# Patient Record
Sex: FEMALE | Race: WHITE | NOT HISPANIC OR LATINO | Employment: OTHER | URBAN - METROPOLITAN AREA
[De-identification: names, ages, dates, MRNs, and addresses within clinical notes are randomized per-mention and may not be internally consistent; named-entity substitution may affect disease eponyms.]

---

## 2017-08-30 ENCOUNTER — TRANSCRIBE ORDERS (OUTPATIENT)
Dept: MAMMOGRAPHY | Facility: HOSPITAL | Age: 77
End: 2017-08-30

## 2017-08-30 DIAGNOSIS — Z12.31 VISIT FOR SCREENING MAMMOGRAM: Primary | ICD-10-CM

## 2017-09-13 ENCOUNTER — HOSPITAL ENCOUNTER (OUTPATIENT)
Dept: MAMMOGRAPHY | Facility: HOSPITAL | Age: 77
Discharge: HOME | End: 2017-09-13
Admitting: INTERNAL MEDICINE

## 2017-09-13 DIAGNOSIS — Z12.31 VISIT FOR SCREENING MAMMOGRAM: ICD-10-CM

## 2017-09-13 PROCEDURE — G0202 SCR MAMMO BI INCL CAD: HCPCS

## 2017-09-13 PROCEDURE — 77063 BREAST TOMOSYNTHESIS BI: CPT | Performed by: RADIOLOGY

## 2017-09-13 PROCEDURE — 77063 BREAST TOMOSYNTHESIS BI: CPT

## 2017-09-13 PROCEDURE — G0202 SCR MAMMO BI INCL CAD: HCPCS | Performed by: RADIOLOGY

## 2018-10-17 ENCOUNTER — TRANSCRIBE ORDERS (OUTPATIENT)
Dept: ADMINISTRATIVE | Facility: HOSPITAL | Age: 78
End: 2018-10-17

## 2018-10-17 DIAGNOSIS — Z12.31 VISIT FOR SCREENING MAMMOGRAM: Primary | ICD-10-CM

## 2018-10-25 ENCOUNTER — HOSPITAL ENCOUNTER (OUTPATIENT)
Dept: MAMMOGRAPHY | Facility: HOSPITAL | Age: 78
Discharge: HOME | End: 2018-10-25
Admitting: INTERNAL MEDICINE

## 2018-10-25 DIAGNOSIS — Z12.31 VISIT FOR SCREENING MAMMOGRAM: ICD-10-CM

## 2018-10-25 PROCEDURE — 77067 SCR MAMMO BI INCL CAD: CPT

## 2018-10-25 PROCEDURE — 77067 SCR MAMMO BI INCL CAD: CPT | Performed by: RADIOLOGY

## 2018-10-25 PROCEDURE — 77063 BREAST TOMOSYNTHESIS BI: CPT | Performed by: RADIOLOGY

## 2018-10-25 PROCEDURE — 77063 BREAST TOMOSYNTHESIS BI: CPT

## 2019-02-27 ENCOUNTER — OFFICE VISIT (OUTPATIENT)
Dept: ORTHOPEDIC SURGERY | Facility: CLINIC | Age: 79
End: 2019-02-27

## 2019-02-27 VITALS — BODY MASS INDEX: 29.38 KG/M2 | HEIGHT: 65 IN | WEIGHT: 176.37 LBS

## 2019-02-27 DIAGNOSIS — M16.11 PRIMARY OSTEOARTHRITIS OF RIGHT HIP: Primary | ICD-10-CM

## 2019-02-27 PROCEDURE — 99203 OFFICE O/P NEW LOW 30 MIN: CPT | Performed by: ORTHOPAEDIC SURGERY

## 2019-02-27 RX ORDER — GABAPENTIN 300 MG/1
CAPSULE ORAL
Refills: 3 | COMMUNITY
Start: 2019-02-16 | End: 2020-09-08

## 2019-02-27 RX ORDER — FERROUS SULFATE 325(65) MG
TABLET ORAL
Refills: 2 | COMMUNITY
Start: 2019-01-23 | End: 2020-09-08

## 2019-02-27 RX ORDER — RIVAROXABAN 20 MG/1
20 TABLET, FILM COATED ORAL
Refills: 5 | COMMUNITY
Start: 2018-12-20

## 2019-02-27 RX ORDER — DOCUSATE SODIUM 100 MG/1
100 CAPSULE, LIQUID FILLED ORAL 2 TIMES DAILY PRN
Refills: 1 | COMMUNITY
Start: 2018-11-24

## 2019-02-27 RX ORDER — LISINOPRIL AND HYDROCHLOROTHIAZIDE 12.5; 1 MG/1; MG/1
1 TABLET ORAL DAILY
COMMUNITY
End: 2020-09-08

## 2019-02-27 RX ORDER — ATORVASTATIN CALCIUM 10 MG/1
10 TABLET, FILM COATED ORAL DAILY
Refills: 2 | COMMUNITY
Start: 2019-01-23 | End: 2020-09-08

## 2019-02-27 RX ORDER — OMEPRAZOLE 20 MG/1
CAPSULE, DELAYED RELEASE ORAL
COMMUNITY
Start: 2019-02-25 | End: 2020-09-08

## 2019-02-27 RX ORDER — BUSPIRONE HYDROCHLORIDE 10 MG/1
10 TABLET ORAL 2 TIMES DAILY
Refills: 2 | COMMUNITY
Start: 2019-02-11 | End: 2020-09-08

## 2019-02-27 RX ORDER — TRAZODONE HYDROCHLORIDE 100 MG/1
TABLET ORAL
Refills: 3 | COMMUNITY
Start: 2019-02-15 | End: 2020-09-08

## 2019-02-27 RX ORDER — DIGOXIN 125 MCG
125 TABLET ORAL DAILY
Refills: 2 | COMMUNITY
Start: 2019-01-06 | End: 2020-09-08

## 2019-02-27 RX ORDER — DICYCLOMINE HYDROCHLORIDE 10 MG/1
10 CAPSULE ORAL
COMMUNITY
End: 2020-09-08

## 2019-02-27 RX ORDER — CYANOCOBALAMIN 1000 UG/ML
INJECTION, SOLUTION INTRAMUSCULAR; SUBCUTANEOUS
Refills: 5 | COMMUNITY
Start: 2019-01-19 | End: 2020-09-08

## 2019-02-27 RX ORDER — PRAMIPEXOLE DIHYDROCHLORIDE 0.5 MG/1
1 TABLET ORAL 2 TIMES DAILY
Refills: 2 | COMMUNITY
Start: 2018-11-27 | End: 2020-09-15 | Stop reason: HOSPADM

## 2019-02-27 NOTE — PROGRESS NOTES
[facility] [place] [facility] Note    Subjective     Chief Complaint   Patient presents with   • Right Hip - Pain        HPI    Zhanna Mccabe is a 79 y.o. female.  She presents today for evaluation of right hip pain.  The pain is intermittent, 4 out of 10, occurring with walking, standing, sitting and climbing stairs.  Occasional pain at night.  The pain is associated with stiffness and giving way.  The pain is aching quality, and she uses a cane or walker for ambulation.  She had an x-ray obtained which showed degeneration of the hip.      There is no problem list on file for this patient.    Past Medical History:   Diagnosis Date   • A-fib (CMS/HCC)    • Breast cancer (CMS/HCC) 1989    left breast   • Depression    • High cholesterol    • Hypertension    • IBS (irritable bowel syndrome)    • Restless leg       Past Surgical History:   Procedure Laterality Date   • ARM SKIN LESION BIOPSY / EXCISION      melanoma   • BLADDER REPAIR      prolapse   • BREAST BIOPSY     • CHOLECYSTECTOMY     • HAND SURGERY Right     4th & 5th fingers   • HYSTERECTOMY  1983   • HYSTERECTOMY     • KNEE SURGERY Left     ORIF   • MASTECTOMY Left 1989   • MASTECTOMY        Family History   Problem Relation Age of Onset   • Breast cancer Maternal Aunt 68   • Breast cancer Maternal Aunt 68   • Cancer Mother    • Hypertension Mother    • Cancer Father    • BRCA 1/2 Neg Hx      Social History     Socioeconomic History   • Marital status:      Spouse name: Not on file   • Number of children: Not on file   • Years of education: Not on file   • Highest education level: Not on file   Social Needs   • Financial resource strain: Not on file   • Food insecurity - worry: Not on file   • Food insecurity - inability: Not on file   • Transportation needs - medical: Not on file   • Transportation needs - non-medical: Not on file   Occupational History   • Not on file   Tobacco Use   • Smoking status: Never Smoker   • Smokeless tobacco: Never  Used   Substance and Sexual Activity   • Alcohol use: No     Frequency: Never   • Drug use: No   • Sexual activity: Defer   Other Topics Concern   • Not on file   Social History Narrative   • Not on file      Current Outpatient Medications on File Prior to Visit   Medication Sig Dispense Refill   • atorvastatin (LIPITOR) 10 MG tablet Take 10 mg by mouth Daily.  2   • busPIRone (BUSPAR) 10 MG tablet Take 10 mg by mouth 2 (Two) Times a Day.  2   • cyanocobalamin 1000 MCG/ML injection INJECT 1 ML IN THE MUSCLE WEEKLY FOR 4 WEEKS, THEN ONCE MONTHLY THEREAFTER  5   • dicyclomine (BENTYL) 10 MG capsule Take 10 mg by mouth 4 (Four) Times a Day Before Meals & at Bedtime.     • digoxin (LANOXIN) 125 MCG tablet Take 125 mcg by mouth Daily.  2   • docusate sodium (COLACE) 100 MG capsule Take 100 mg by mouth 2 (Two) Times a Day.  1   • ferrous sulfate 325 (65 FE) MG tablet TAKE 1 TABLET BY MOUTH EVERY DAY WITH OTC VIT C 500  2   • gabapentin (NEURONTIN) 300 MG capsule TAKE 1 CAPSULE BY MOUTH 2-3 TIMES PER DAY  3   • lisinopril-hydrochlorothiazide (PRINZIDE,ZESTORETIC) 10-12.5 MG per tablet Take 1 tablet by mouth Daily.     • metoprolol tartrate (LOPRESSOR) 25 MG tablet Take 25 mg by mouth 2 (Two) Times a Day.  6   • omeprazole (priLOSEC) 20 MG capsule      • pramipexole (MIRAPEX) 0.5 MG tablet Take 0.5 mg by mouth Every Evening.  2   • traZODone (DESYREL) 100 MG tablet TAKE 1 TABLET BY MOUTH EVERYDAY AT BEDTIME  3   • XARELTO 20 MG tablet TABLETS BY MOUTH 1 TAB DAILY  5     No current facility-administered medications on file prior to visit.       Allergies   Allergen Reactions   • Abilify [Aripiprazole] Swelling     Of mouth and lips   • Ambien [Zolpidem] Other (See Comments)     Sleep walks   • Lyrica [Pregabalin] Swelling     Of mouth and lips        Review of Systems   Constitutional: Positive for activity change and fatigue. Negative for appetite change, chills, diaphoresis, fever and unexpected weight change.   HENT:  "Positive for hearing loss. Negative for congestion, dental problem, drooling, ear discharge, ear pain, facial swelling, mouth sores, nosebleeds, postnasal drip, rhinorrhea, sinus pressure, sneezing, sore throat, tinnitus, trouble swallowing and voice change.    Eyes: Negative for photophobia, pain, discharge, redness, itching and visual disturbance.   Respiratory: Positive for apnea and shortness of breath. Negative for cough, choking, chest tightness, wheezing and stridor.    Cardiovascular: Negative for chest pain, palpitations and leg swelling.   Gastrointestinal: Negative for abdominal distention, abdominal pain, anal bleeding, blood in stool, constipation, diarrhea, nausea, rectal pain and vomiting.   Endocrine: Negative for cold intolerance, heat intolerance, polydipsia, polyphagia and polyuria.   Genitourinary: Negative for decreased urine volume, difficulty urinating, dysuria, enuresis, flank pain, frequency, genital sores, hematuria and urgency.   Musculoskeletal: Positive for gait problem. Negative for arthralgias, back pain, joint swelling, myalgias, neck pain and neck stiffness.   Skin: Negative for color change, pallor, rash and wound.   Allergic/Immunologic: Negative for environmental allergies, food allergies and immunocompromised state.   Neurological: Positive for weakness. Negative for dizziness, tremors, seizures, syncope, facial asymmetry, speech difficulty, light-headedness, numbness and headaches.   Hematological: Negative for adenopathy. Does not bruise/bleed easily.   Psychiatric/Behavioral: Positive for sleep disturbance. Negative for agitation, behavioral problems, confusion, decreased concentration, dysphoric mood, hallucinations, self-injury and suicidal ideas. The patient is nervous/anxious. The patient is not hyperactive.         Objective      Physical Exam  Ht 166 cm (65.35\")   Wt 80 kg (176 lb 5.9 oz)   BMI 29.03 kg/m²     Body mass index is 29.03 kg/m².    General:   Mental " Status:  Alert   Appearance: Cooperative, in no acute distress   Build and Nutrition: Well-nourished and well developed female   Orientation: Alert and oriented to person, place and time   Posture: Normal   Gait: Slow and cautious, but not particularly antalgic today    Integument:   Right hip: No skin lesions, no rash, no ecchymosis    Neurologic:   Sensation:    Right foot: Intact to light touch on the dorsal and plantar aspect   Motor:  Right lower extremity: 5/5 quadriceps, hamstrings, ankle dorsiflexors, and ankle plantar flexors    Vascular:   Right lower extremity: 2+ dorsalis pedis pulse, prompt capillary refill    Lower Extremities:   Right Hip:    Tenderness:  None    Swelling: None    Crepitus:  None    Atrophy:  None    Range of motion:  External Rotation: 30°       Internal Rotation: 30°       Flexion:  90°       Extension:  0°   Instability:  None  Deformities:  None  Functional testing: Positive Stinchfield    No leg length discrepancy        Imaging/Studies    Outside radiographs from [facility] from 6/22/2018 were reviewed, which showed moderate degenerative changes with no acute bony process.  Joint space narrowing was appreciated.    Assessment and Plan     Zhanna was seen today for pain.    Diagnoses and all orders for this visit:    Primary osteoarthritis of right hip  -     External Facility Surgical/Procedural Request; Future        I reviewed my findings with the patient today.  She does have right hip arthritis.  She preferred a more conservative approach, given her intermittent symptoms.  For both diagnostic and therapeutic purposes, we discussed an intra-articular hip injection, she would like to proceed.  She would like to avoid surgery if possible.  I will see her back after the hip injection, but sooner for any problems.    I would like to obtain new x-rays on her next visit.    Return in about 4 weeks (around 3/27/2019) for After Hip Injection, Recheck with  X-Rays.      Medical Decision Making  Management Options : prescription/IM medicine  Data/Risk: independent visualization of imaging, lab tests, or EMG/NCV      Allan Quintana MD  02/27/19  5:38 PM

## 2019-03-08 ENCOUNTER — OUTSIDE FACILITY SERVICE (OUTPATIENT)
Dept: ORTHOPEDIC SURGERY | Facility: CLINIC | Age: 79
End: 2019-03-08

## 2019-03-08 PROCEDURE — 77002 NEEDLE LOCALIZATION BY XRAY: CPT | Performed by: ORTHOPAEDIC SURGERY

## 2019-03-08 PROCEDURE — 20610 DRAIN/INJ JOINT/BURSA W/O US: CPT | Performed by: ORTHOPAEDIC SURGERY

## 2019-04-17 ENCOUNTER — OFFICE VISIT (OUTPATIENT)
Dept: ORTHOPEDIC SURGERY | Facility: CLINIC | Age: 79
End: 2019-04-17

## 2019-04-17 VITALS — WEIGHT: 176.37 LBS | HEIGHT: 65 IN | BODY MASS INDEX: 29.38 KG/M2 | OXYGEN SATURATION: 94 % | HEART RATE: 76 BPM

## 2019-04-17 DIAGNOSIS — M16.11 PRIMARY OSTEOARTHRITIS OF RIGHT HIP: Primary | ICD-10-CM

## 2019-04-17 PROCEDURE — 99213 OFFICE O/P EST LOW 20 MIN: CPT | Performed by: ORTHOPAEDIC SURGERY

## 2019-04-17 RX ORDER — MAGNESIUM OXIDE 400 MG/1
TABLET ORAL
Refills: 1 | COMMUNITY
Start: 2019-02-25 | End: 2020-09-08

## 2019-04-17 RX ORDER — RANITIDINE 150 MG/1
TABLET ORAL
COMMUNITY
Start: 2019-04-11 | End: 2020-09-08

## 2019-04-17 NOTE — PROGRESS NOTES
[facility] [place] [facility] Note    Subjective     Chief Complaint   Patient presents with   • Follow-up     1 month follow up after right hip injection @ [facility] 03/08/19        HPI    Zhanna Mccabe is a 79 y.o. female.  She follows up today for her right hip.  She continues to have pain in the hip, with improvement in her pain for a couple of weeks following the injection, but the pain has returned.  Pain is 9 out of 10, aching in quality, worse with walking, standing and climbing stairs.  It is associated with stiffness.  Overall she has multiple aches and pains, shoulders, and both legs.  The hip pain is overall improved following the injection, but is starting to return to its preinjection level.      There is no problem list on file for this patient.    Past Medical History:   Diagnosis Date   • A-fib (CMS/HCC)    • Breast cancer (CMS/HCC) 1989    left breast   • Depression    • High cholesterol    • Hypertension    • IBS (irritable bowel syndrome)    • Restless leg       Past Surgical History:   Procedure Laterality Date   • ARM SKIN LESION BIOPSY / EXCISION      melanoma   • BLADDER REPAIR      prolapse   • BREAST BIOPSY     • CHOLECYSTECTOMY     • HAND SURGERY Right     4th & 5th fingers   • HYSTERECTOMY  1983   • HYSTERECTOMY     • KNEE SURGERY Left     ORIF   • MASTECTOMY Left 1989   • MASTECTOMY        Family History   Problem Relation Age of Onset   • Breast cancer Maternal Aunt 68   • Breast cancer Maternal Aunt 68   • Cancer Mother    • Hypertension Mother    • Cancer Father    • BRCA 1/2 Neg Hx      Social History     Socioeconomic History   • Marital status:      Spouse name: Not on file   • Number of children: Not on file   • Years of education: Not on file   • Highest education level: Not on file   Tobacco Use   • Smoking status: Never Smoker   • Smokeless tobacco: Never Used   Substance and Sexual Activity   • Alcohol use: No     Frequency: Never   • Drug use: No   • Sexual activity:  Defer      Current Outpatient Medications on File Prior to Visit   Medication Sig Dispense Refill   • atorvastatin (LIPITOR) 10 MG tablet Take 10 mg by mouth Daily.  2   • busPIRone (BUSPAR) 10 MG tablet Take 10 mg by mouth 2 (Two) Times a Day.  2   • cyanocobalamin 1000 MCG/ML injection INJECT 1 ML IN THE MUSCLE WEEKLY FOR 4 WEEKS, THEN ONCE MONTHLY THEREAFTER  5   • dicyclomine (BENTYL) 10 MG capsule Take 10 mg by mouth 4 (Four) Times a Day Before Meals & at Bedtime.     • digoxin (LANOXIN) 125 MCG tablet Take 125 mcg by mouth Daily.  2   • docusate sodium (COLACE) 100 MG capsule Take 100 mg by mouth 2 (Two) Times a Day.  1   • ferrous sulfate 325 (65 FE) MG tablet TAKE 1 TABLET BY MOUTH EVERY DAY WITH OTC VIT C 500  2   • gabapentin (NEURONTIN) 300 MG capsule TAKE 1 CAPSULE BY MOUTH 2-3 TIMES PER DAY  3   • lisinopril-hydrochlorothiazide (PRINZIDE,ZESTORETIC) 10-12.5 MG per tablet Take 1 tablet by mouth Daily.     • magnesium oxide (MAG-OX) 400 MG tablet TAKE 1 TABLET BY MOUTH DAILY FOR LEG CRAMPS  1   • metoprolol tartrate (LOPRESSOR) 25 MG tablet Take 25 mg by mouth 2 (Two) Times a Day.  6   • omeprazole (priLOSEC) 20 MG capsule      • pramipexole (MIRAPEX) 0.5 MG tablet Take 0.5 mg by mouth Every Evening.  2   • raNITIdine (ZANTAC) 150 MG tablet      • traZODone (DESYREL) 100 MG tablet TAKE 1 TABLET BY MOUTH EVERYDAY AT BEDTIME  3   • XARELTO 20 MG tablet TABLETS BY MOUTH 1 TAB DAILY  5     No current facility-administered medications on file prior to visit.       Allergies   Allergen Reactions   • Abilify [Aripiprazole] Swelling     Of mouth and lips   • Ambien [Zolpidem] Other (See Comments)     Sleep walks   • Lyrica [Pregabalin] Swelling     Of mouth and lips        Review of Systems   Constitutional: Negative for activity change, appetite change, chills, diaphoresis, fatigue, fever and unexpected weight change.   HENT: Negative for congestion, dental problem, drooling, ear discharge, ear pain, facial  "swelling, hearing loss, mouth sores, nosebleeds, postnasal drip, rhinorrhea, sinus pressure, sneezing, sore throat, tinnitus, trouble swallowing and voice change.    Eyes: Negative for photophobia, pain, discharge, redness, itching and visual disturbance.   Respiratory: Negative for apnea, cough, choking, chest tightness, shortness of breath, wheezing and stridor.    Cardiovascular: Negative for chest pain, palpitations and leg swelling.   Gastrointestinal: Negative for abdominal distention, abdominal pain, anal bleeding, blood in stool, constipation, diarrhea, nausea, rectal pain and vomiting.   Endocrine: Negative for cold intolerance, heat intolerance, polydipsia, polyphagia and polyuria.   Genitourinary: Negative for decreased urine volume, difficulty urinating, dysuria, enuresis, flank pain, frequency, genital sores, hematuria and urgency.   Musculoskeletal: Positive for joint swelling. Negative for arthralgias, back pain, gait problem, myalgias, neck pain and neck stiffness.   Skin: Negative for color change, pallor, rash and wound.   Allergic/Immunologic: Negative for environmental allergies, food allergies and immunocompromised state.   Neurological: Negative for dizziness, tremors, seizures, syncope, facial asymmetry, speech difficulty, weakness, light-headedness, numbness and headaches.   Hematological: Negative for adenopathy. Does not bruise/bleed easily.   Psychiatric/Behavioral: Negative for agitation, behavioral problems, confusion, decreased concentration, dysphoric mood, hallucinations, self-injury, sleep disturbance and suicidal ideas. The patient is not nervous/anxious and is not hyperactive.         Objective      Physical Exam  Pulse 76   Ht 166 cm (65.35\")   Wt 80 kg (176 lb 5.9 oz)   SpO2 94%   Breastfeeding? No   BMI 29.03 kg/m²     Body mass index is 29.03 kg/m².    General:   Mental Status:  Alert   Appearance: Cooperative, in no acute distress   Build and Nutrition: Well-nourished " well-developed female   Orientation: Alert and oriented to person, place and time   Posture: Normal   Gait: Slow and cautious with mild limp on the right    Integument:   Right hip: No skin lesions, no rash, no ecchymosis    Lower Extremity:   Right Hip:    Tenderness:  None    Swelling:  None    Crepitus:  None    Range of motion:  External Rotation: 30°       Internal Rotation: 30°       Flexion:  100°       Extension:  0°    Deformities:  None  Functional testing: Positive Stinchfield    No leg length discrepancy      Imaging/Studies  Imaging Results (last 24 hours)     Procedure Component Value Units Date/Time    XR Hip With or Without Pelvis 1 View Right [[id number]] Resulted:  04/17/19 0853     Updated:  04/17/19 0854    Narrative:       Right Hip Radiographs  Indication: right hip pain  Views: low AP pelvis and lateral of the right hip    Comparison: Outside films from 6/22/2018    Findings:   Stable findings compared to the previous films, with bone-on-bone contact,   medial wear in particular, with lateral calcification in the labral   region, with no acute bony abnormalities, and no unusual bony features.    Impression: Advanced right hip arthritis.            Assessment and Plan     Zhanna was seen today for follow-up.    Diagnoses and all orders for this visit:    Primary osteoarthritis of right hip  -     XR Hip With or Without Pelvis 1 View Right        1. Primary osteoarthritis of right hip        I reviewed my findings with the patient today.  She does have right hip arthritis, and may benefit from a hip replacement.  She does have multiple other joint pains, to include her shoulders and both lower extremities.  I think she would benefit from seeing a rheumatologist, and she is going to coordinate this through her primary care physician, Dr. Jose Waller.  I will see her back in 6 months, but sooner for any problems.    Return in about 6 months (around 10/17/2019).      Medical Decision  Making  Data/Risk: radiology tests and independent visualization of imaging, lab tests, or EMG/NCV      Allan Quintana MD  04/17/19  9:02 AM

## 2019-10-01 ENCOUNTER — TRANSCRIBE ORDERS (OUTPATIENT)
Dept: ADMINISTRATIVE | Facility: HOSPITAL | Age: 79
End: 2019-10-01

## 2019-10-01 DIAGNOSIS — Z92.89 HISTORY OF SCREENING MAMMOGRAPHY: Primary | ICD-10-CM

## 2019-10-01 DIAGNOSIS — Z12.31 VISIT FOR SCREENING MAMMOGRAM: ICD-10-CM

## 2019-10-23 ENCOUNTER — OFFICE VISIT (OUTPATIENT)
Dept: ORTHOPEDIC SURGERY | Facility: CLINIC | Age: 79
End: 2019-10-23

## 2019-10-23 VITALS — HEART RATE: 50 BPM | BODY MASS INDEX: 31.07 KG/M2 | OXYGEN SATURATION: 94 % | HEIGHT: 65 IN | WEIGHT: 186.51 LBS

## 2019-10-23 DIAGNOSIS — M16.11 PRIMARY OSTEOARTHRITIS OF RIGHT HIP: Primary | ICD-10-CM

## 2019-10-23 PROCEDURE — 99212 OFFICE O/P EST SF 10 MIN: CPT | Performed by: ORTHOPAEDIC SURGERY

## 2019-10-23 NOTE — PROGRESS NOTES
[facility] Note    Subjective     Chief Complaint   Patient presents with   • Follow-up     6 month follow up; Primary osteoarthritis of right hip         HPI    Zhanna Mccabe is a 79 y.o. female.  She follows up today for her right hip.  She continues to have pain in the hip, but has multiple other issues going on, most involving her abdomen.  Hip pain is primarily posteriorly.  She walks with a cane.      There is no problem list on file for this patient.    Past Medical History:   Diagnosis Date   • A-fib (CMS/HCC)    • Breast cancer (CMS/HCC) 1989    left breast   • Depression    • High cholesterol    • Hypertension    • IBS (irritable bowel syndrome)    • Restless leg       Past Surgical History:   Procedure Laterality Date   • ARM SKIN LESION BIOPSY / EXCISION      melanoma   • BLADDER REPAIR      prolapse   • BREAST BIOPSY     • CHOLECYSTECTOMY     • HAND SURGERY Right     4th & 5th fingers   • HYSTERECTOMY  1983   • HYSTERECTOMY     • KNEE SURGERY Left     ORIF   • MASTECTOMY Left 1989   • MASTECTOMY        Family History   Problem Relation Age of Onset   • Breast cancer Maternal Aunt 68   • Breast cancer Maternal Aunt 68   • Cancer Mother    • Hypertension Mother    • Cancer Father    • BRCA 1/2 Neg Hx      Social History     Socioeconomic History   • Marital status:      Spouse name: Not on file   • Number of children: Not on file   • Years of education: Not on file   • Highest education level: Not on file   Tobacco Use   • Smoking status: Never Smoker   • Smokeless tobacco: Never Used   Substance and Sexual Activity   • Alcohol use: No     Frequency: Never   • Drug use: No   • Sexual activity: Defer      Current Outpatient Medications on File Prior to Visit   Medication Sig Dispense Refill   • atorvastatin (LIPITOR) 10 MG tablet Take 10 mg by mouth Daily.  2   • busPIRone (BUSPAR) 10 MG tablet Take 10 mg by mouth 2 (Two) Times a Day.  2   • cyanocobalamin 1000 MCG/ML  injection INJECT 1 ML IN THE MUSCLE WEEKLY FOR 4 WEEKS, THEN ONCE MONTHLY THEREAFTER  5   • dicyclomine (BENTYL) 10 MG capsule Take 10 mg by mouth 4 (Four) Times a Day Before Meals & at Bedtime.     • digoxin (LANOXIN) 125 MCG tablet Take 125 mcg by mouth Daily.  2   • docusate sodium (COLACE) 100 MG capsule Take 100 mg by mouth 2 (Two) Times a Day.  1   • ferrous sulfate 325 (65 FE) MG tablet TAKE 1 TABLET BY MOUTH EVERY DAY WITH OTC VIT C 500  2   • gabapentin (NEURONTIN) 300 MG capsule TAKE 1 CAPSULE BY MOUTH 2-3 TIMES PER DAY  3   • lisinopril-hydrochlorothiazide (PRINZIDE,ZESTORETIC) 10-12.5 MG per tablet Take 1 tablet by mouth Daily.     • magnesium oxide (MAG-OX) 400 MG tablet TAKE 1 TABLET BY MOUTH DAILY FOR LEG CRAMPS  1   • metoprolol tartrate (LOPRESSOR) 25 MG tablet Take 25 mg by mouth 2 (Two) Times a Day.  6   • omeprazole (priLOSEC) 20 MG capsule      • pramipexole (MIRAPEX) 0.5 MG tablet Take 0.5 mg by mouth Every Evening.  2   • raNITIdine (ZANTAC) 150 MG tablet      • traZODone (DESYREL) 100 MG tablet TAKE 1 TABLET BY MOUTH EVERYDAY AT BEDTIME  3   • XARELTO 20 MG tablet TABLETS BY MOUTH 1 TAB DAILY  5     No current facility-administered medications on file prior to visit.       Allergies   Allergen Reactions   • Abilify [Aripiprazole] Swelling     Of mouth and lips   • Ambien [Zolpidem] Other (See Comments)     Sleep walks   • Lyrica [Pregabalin] Swelling     Of mouth and lips        Review of Systems   Constitutional: Negative.    HENT: Negative.    Eyes: Negative.    Respiratory: Negative.    Cardiovascular: Positive for leg swelling.   Gastrointestinal: Positive for abdominal pain and diarrhea.   Endocrine: Negative.    Genitourinary: Negative.    Musculoskeletal: Positive for arthralgias.   Skin: Negative.    Allergic/Immunologic: Negative.    Neurological: Negative.    Hematological: Negative.    Psychiatric/Behavioral: Negative.         Objective      Physical Exam  Pulse 50   Ht 166 cm  "(65.35\")   Wt 84.6 kg (186 lb 8.2 oz)   SpO2 94%   BMI 30.70 kg/m²     Body mass index is 30.7 kg/m².    General:   Mental Status:  Alert   Appearance: Cooperative, in no acute distress   Build and Nutrition: Well-nourished well-developed female   Orientation: Alert and oriented to person, place and time   Posture: Normal   Gait: With a cane, and a slight limp on the right    Lower Extremity:              Right Hip:                          Tenderness:    None                          Swelling:          None                          Crepitus:          None                          Range of motion:        External Rotation:       30°                                                              Internal Rotation:        30°                                                              Flexion:                       90°                                                              Extension:                   0°                       Deformities:     None  Functional testing:  [facility]                          No leg length discrepancy      Assessment and Plan     Zhanna was seen today for follow-up.    Diagnoses and all orders for this visit:    Primary osteoarthritis of right hip        1. Primary osteoarthritis of right hip        I reviewed my findings with the patient today.  She continues to have right hip pain, the pain is posterior, which may be coming from her back.  She has multiple other issues going on medically that take precedent, and is scheduled for colonoscopy and a urology procedure in the near future.  I will see her back in 6 months for checkup, but sooner for any problems.  X-rays of her hip on the next visit.    Return in about 6 months (around 4/23/2020).          Allan Quintana MD  10/23/19  9:42 AM            "

## 2019-11-26 ENCOUNTER — APPOINTMENT (OUTPATIENT)
Dept: MAMMOGRAPHY | Facility: HOSPITAL | Age: 79
End: 2019-11-26

## 2020-02-12 ENCOUNTER — TRANSCRIBE ORDERS (OUTPATIENT)
Dept: ADMINISTRATIVE | Facility: HOSPITAL | Age: 80
End: 2020-02-12

## 2020-02-12 DIAGNOSIS — Z12.31 VISIT FOR SCREENING MAMMOGRAM: Primary | ICD-10-CM

## 2020-04-09 ENCOUNTER — APPOINTMENT (OUTPATIENT)
Dept: MAMMOGRAPHY | Facility: HOSPITAL | Age: 80
End: 2020-04-09

## 2020-06-01 ENCOUNTER — APPOINTMENT (OUTPATIENT)
Dept: MAMMOGRAPHY | Facility: HOSPITAL | Age: 80
End: 2020-06-01

## 2020-07-18 ENCOUNTER — APPOINTMENT (OUTPATIENT)
Dept: CARDIOLOGY | Facility: HOSPITAL | Age: 80
End: 2020-07-18

## 2020-07-18 ENCOUNTER — HOSPITAL ENCOUNTER (EMERGENCY)
Facility: HOSPITAL | Age: 80
Discharge: HOME | End: 2020-07-18
Attending: EMERGENCY MEDICINE | Admitting: EMERGENCY MEDICINE

## 2020-07-18 VITALS
SYSTOLIC BLOOD PRESSURE: 186 MMHG | OXYGEN SATURATION: 95 % | TEMPERATURE: 97.9 F | BODY MASS INDEX: 28.32 KG/M2 | RESPIRATION RATE: 14 BRPM | HEART RATE: 60 BPM | DIASTOLIC BLOOD PRESSURE: 82 MMHG | HEIGHT: 65 IN | WEIGHT: 170 LBS

## 2020-07-18 DIAGNOSIS — L03.116 CELLULITIS OF LEFT LOWER LEG: Primary | ICD-10-CM

## 2020-07-18 LAB
BH CV ECHO MEAS - BSA(HAYCOCK): 1.9 M^2
BH CV ECHO MEAS - BSA: 1.8 M^2
BH CV ECHO MEAS - BZI_BMI: 28.3 KILOGRAMS/M^2
BH CV ECHO MEAS - BZI_METRIC_HEIGHT: 165.1 CM
BH CV ECHO MEAS - BZI_METRIC_WEIGHT: 77.1 KG
BH CV LOWER VASCULAR LEFT COMMON FEMORAL AUGMENT: NORMAL
BH CV LOWER VASCULAR LEFT COMMON FEMORAL COMPRESS: NORMAL
BH CV LOWER VASCULAR LEFT COMMON FEMORAL PHASIC: NORMAL
BH CV LOWER VASCULAR LEFT COMMON FEMORAL SPONT: NORMAL
BH CV LOWER VASCULAR LEFT DISTAL FEMORAL COMPRESS: NORMAL
BH CV LOWER VASCULAR LEFT GASTRONEMIUS COMPRESS: NORMAL
BH CV LOWER VASCULAR LEFT GREATER SAPH AK COMPRESS: NORMAL
BH CV LOWER VASCULAR LEFT GREATER SAPH BK COMPRESS: NORMAL
BH CV LOWER VASCULAR LEFT LESSER SAPH COMPRESS: NORMAL
BH CV LOWER VASCULAR LEFT MID FEMORAL AUGMENT: NORMAL
BH CV LOWER VASCULAR LEFT MID FEMORAL COMPRESS: NORMAL
BH CV LOWER VASCULAR LEFT MID FEMORAL PHASIC: NORMAL
BH CV LOWER VASCULAR LEFT MID FEMORAL SPONT: NORMAL
BH CV LOWER VASCULAR LEFT PERONEAL COMPRESS: NORMAL
BH CV LOWER VASCULAR LEFT POPLITEAL AUGMENT: NORMAL
BH CV LOWER VASCULAR LEFT POPLITEAL COMPRESS: NORMAL
BH CV LOWER VASCULAR LEFT POPLITEAL PHASIC: NORMAL
BH CV LOWER VASCULAR LEFT POPLITEAL SPONT: NORMAL
BH CV LOWER VASCULAR LEFT POSTERIOR TIBIAL COMPRESS: NORMAL
BH CV LOWER VASCULAR LEFT PROFUNDA FEMORAL COMPRESS: NORMAL
BH CV LOWER VASCULAR LEFT PROXIMAL FEMORAL COMPRESS: NORMAL
BH CV LOWER VASCULAR LEFT SAPHENOFEMORAL JUNCTION AUGMENT: NORMAL
BH CV LOWER VASCULAR LEFT SAPHENOFEMORAL JUNCTION COMPRESS: NORMAL
BH CV LOWER VASCULAR LEFT SAPHENOFEMORAL JUNCTION PHASIC: NORMAL
BH CV LOWER VASCULAR LEFT SAPHENOFEMORAL JUNCTION SPONT: NORMAL
BH CV LOWER VASCULAR RIGHT COMMON FEMORAL AUGMENT: NORMAL
BH CV LOWER VASCULAR RIGHT COMMON FEMORAL COMPRESS: NORMAL
BH CV LOWER VASCULAR RIGHT COMMON FEMORAL PHASIC: NORMAL
BH CV LOWER VASCULAR RIGHT COMMON FEMORAL SPONT: NORMAL

## 2020-07-18 PROCEDURE — 93971 EXTREMITY STUDY: CPT

## 2020-07-18 PROCEDURE — 99284 EMERGENCY DEPT VISIT MOD MDM: CPT

## 2020-07-18 PROCEDURE — 93971 EXTREMITY STUDY: CPT | Performed by: INTERNAL MEDICINE

## 2020-07-18 PROCEDURE — 63710000001 ONDANSETRON ODT 4 MG TABLET DISPERSIBLE: Performed by: EMERGENCY MEDICINE

## 2020-07-18 RX ORDER — ONDANSETRON 4 MG/1
4 TABLET, ORALLY DISINTEGRATING ORAL ONCE
Status: COMPLETED | OUTPATIENT
Start: 2020-07-18 | End: 2020-07-18

## 2020-07-18 RX ORDER — AMIODARONE HYDROCHLORIDE 200 MG/1
200 TABLET ORAL DAILY
COMMUNITY

## 2020-07-18 RX ORDER — DULOXETIN HYDROCHLORIDE 30 MG/1
30 CAPSULE, DELAYED RELEASE ORAL DAILY
COMMUNITY
End: 2020-09-15 | Stop reason: HOSPADM

## 2020-07-18 RX ORDER — HYDROCODONE BITARTRATE AND ACETAMINOPHEN 7.5; 325 MG/1; MG/1
1 TABLET ORAL EVERY 6 HOURS PRN
Status: ON HOLD | COMMUNITY
End: 2020-09-15 | Stop reason: SDUPTHER

## 2020-07-18 RX ORDER — CLINDAMYCIN HYDROCHLORIDE 300 MG/1
300 CAPSULE ORAL 3 TIMES DAILY
Qty: 30 CAPSULE | Refills: 0 | Status: SHIPPED | OUTPATIENT
Start: 2020-07-18 | End: 2020-09-08

## 2020-07-18 RX ORDER — HYDROCODONE BITARTRATE AND ACETAMINOPHEN 5; 325 MG/1; MG/1
1 TABLET ORAL ONCE
Status: COMPLETED | OUTPATIENT
Start: 2020-07-18 | End: 2020-07-18

## 2020-07-18 RX ADMIN — HYDROCODONE BITARTRATE AND ACETAMINOPHEN 1 TABLET: 5; 325 TABLET ORAL at 16:52

## 2020-07-18 RX ADMIN — ONDANSETRON 4 MG: 4 TABLET, ORALLY DISINTEGRATING ORAL at 16:44

## 2020-07-18 NOTE — ED PROVIDER NOTES
Subjective   80-year-old female presents for evaluation of left foot/lower leg pain/swelling.  Of note, the patient states that she underwent lumbar surgery 4 days ago following a fall at an outside facility.  The following day after surgery she began experiencing pain, swelling, and redness to her left foot/ankle/lower extremity that has persisted since that time.  Of note, the patient has a history of atrial fibrillation for which she is anticoagulated and endorses compliance with all of her medications.  She denies any fevers or systemic symptoms.  She denies any paresthesias.  She denies any injury or trauma to her left lower extremity that could have triggered her current symptoms.          Review of Systems   Skin:        Redness and soft tissue swelling to left foot/ankle/lower leg   All other systems reviewed and are negative.      Past Medical History:   Diagnosis Date   • A-fib (CMS/[facility])    • Breast cancer (CMS/[facility]) 1989    left breast   • Depression    • High cholesterol    • Hypertension    • IBS (irritable bowel syndrome)    • Restless leg        Allergies   Allergen Reactions   • Abilify [Aripiprazole] Swelling     Of mouth and lips   • Ambien [Zolpidem] Other (See Comments)     Sleep walks   • Lyrica [Pregabalin] Swelling     Of mouth and lips       Past Surgical History:   Procedure Laterality Date   • ARM SKIN LESION BIOPSY / EXCISION      melanoma   • BLADDER REPAIR      prolapse   • BREAST BIOPSY     • CHOLECYSTECTOMY     • HAND SURGERY Right     4th & 5th fingers   • HYSTERECTOMY  1983   • HYSTERECTOMY     • KNEE SURGERY Left     ORIF   • MASTECTOMY Left 1989   • MASTECTOMY         Family History   Problem Relation Age of Onset   • Breast cancer Maternal Aunt 68   • Breast cancer Maternal Aunt 68   • Cancer Mother    • Hypertension Mother    • Cancer Father    • BRCA 1/2 Neg Hx        Social History     Socioeconomic History   • Marital status:      Spouse name: Not on file   • Number of  children: Not on file   • Years of education: Not on file   • Highest education level: Not on file   Tobacco Use   • Smoking status: Never Smoker   • Smokeless tobacco: Never Used   Substance and Sexual Activity   • Alcohol use: No     Frequency: Never   • Drug use: No   • Sexual activity: Defer           Objective   Physical Exam   Constitutional: She is oriented to person, place, and time. She appears well-developed and well-nourished. No distress.   Nontoxic-appearing female   HENT:   Head: Normocephalic and atraumatic.   Mouth/Throat: Oropharynx is clear and moist.   No mucous membrane lesions   Eyes: Pupils are equal, round, and reactive to light. EOM are normal.   Cardiovascular: Normal rate, regular rhythm, normal heart sounds and intact distal pulses. Exam reveals no gallop and no friction rub.   No murmur heard.  Pulmonary/Chest: Effort normal and breath sounds normal. No respiratory distress. She has no wheezes. She has no rales.   Abdominal: Soft. Bowel sounds are normal. She exhibits no distension and no mass. There is no tenderness. There is no rebound and no guarding.   Musculoskeletal: Normal range of motion.   Circumferential soft tissue swelling noted to left foot and ankle as well as distal left lower leg    Negative Homans sign, no palpable cords    No midline lumbar spine tenderness to palpation, no step-off or deformity noted   Neurological: She is alert and oriented to person, place, and time.   Left lower extremity is neurovascularly intact distally with bounding distal pulses and normal sensation   Skin: She is not diaphoretic. There is erythema.   Warm, tender, macular erythema noted to dorsal aspect of left foot, left ankle circumferentially, and distal aspect of left lower leg    No crepitus, no pain out of proportion to exam, no purulence, negative Nikolsky's sign   Psychiatric: She has a normal mood and affect. Judgment and thought content normal.   Nursing note and vitals  reviewed.      Procedures           ED Course  ED Course as of Jul 18 1844   Sat Jul 18, 2020   1530 80-year-old female presents for evaluation of left foot pain and swelling.  Of note, the patient had surgery on her lumbar spine 4 days ago.  She states that the following day she began experiencing pain and swelling in her left foot and ankle as well as her left lower leg that has persisted since that time.  She denies any injury or trauma to the leg.  Of note, the patient is anticoagulated for atrial fibrillation and endorses compliance with her medications.  No fevers or systemic symptoms.  On arrival, the patient is nontoxic-appearing.  Exam remarkable for circumferential swelling to left foot/ankle/distal lower leg with overlying macular erythema and warmth.  No pain out of proportion to exam.  She is neurovascularly intact distally with bounding distal pulses and normal sensation.  No fevers or systemic symptoms noted.  We will obtain a Doppler ultrasound to rule out DVT and will reassess following initial interventions.    [DD]   1626 Ultrasound is negative for DVT.  I feel that the patient's overall clinical presentation appears consistent with cellulitis.  Doubt septic joint at this time given her overall appearance.  Prescription for clindamycin.  She will follow-up with her primary care physician within the next week for recheck.  Agreeable with plan and given appropriate strict return precautions.    [DD]      ED Course User Index  [DD] Hamlet Montana MD                                 Recent Results (from the past 24 hour(s))   Duplex Venous Lower Extremity - Left    Collection Time: 07/18/20  3:53 PM   Result Value Ref Range    BSA 1.8 m^2     CV ECHO ALFRED - BZI_BMI 28.3 kilograms/m^2     CV ECHO ALFRED - BSA(HAYCOCK) 1.9 m^2     CV ECHO ALFRED - BZI_METRIC_WEIGHT 77.1 kg     CV ECHO ALFRED - BZI_METRIC_HEIGHT 165.1 cm    Right Common Femoral Spont Y     Right Common Femoral Phasic Y     Right  "Common Femoral Augment Y     Right Common Femoral Compress C     Left Common Femoral Spont Y     Left Common Femoral Phasic Y     Left Common Femoral Augment Y     Left Common Femoral Compress C     Left Saphenofemoral Junction Spont Y     Left Saphenofemoral Junction Phasic Y     Left Saphenofemoral Junction Augment Y     Left Saphenofemoral Junction Compress C     Left Profunda Femoral Compress C     Left Proximal Femoral Compress C     Left Mid Femoral Spont Y     Left Mid Femoral Phasic Y     Left Mid Femoral Augment Y     Left Mid Femoral Compress C     Left Distal Femoral Compress C     Left Popliteal Spont Y     Left Popliteal Phasic Y     Left Popliteal Augment Y     Left Popliteal Compress C     Left Posterior Tibial Compress C     Left Peroneal Compress C     Left GastronemiusSoleal Compress C     Left Greater Saph AK Compress C     Left Greater Saph BK Compress C     Left Lesser Saph Compress C      Note: In addition to lab results from this visit, the labs listed above may include labs taken at another facility or during a different encounter within the last 24 hours. Please correlate lab times with ED admission and discharge times for further clarification of the services performed during this visit.    No orders to display     Vitals:    07/18/20 1528 07/18/20 1553 07/18/20 1600 07/18/20 1654   BP:   164/77 (!) 186/82   Pulse:    60   Resp:       Temp:       TempSrc:       SpO2: 95%   95%   Weight:  77.1 kg (170 lb)     Height:  165.1 cm (65\")       Medications   HYDROcodone-acetaminophen (NORCO) 5-325 MG per tablet 1 tablet (1 tablet Oral Given 7/18/20 1652)   ondansetron ODT (ZOFRAN-ODT) disintegrating tablet 4 mg (4 mg Oral Given 7/18/20 1644)     ECG/EMG Results (last 24 hours)     ** No results found for the last 24 hours. **        No orders to display                 MDM    Final diagnoses:   Cellulitis of left lower leg            Hamlet Montana MD  07/18/20 1849    "

## 2020-07-28 ENCOUNTER — HOSPITAL ENCOUNTER (OUTPATIENT)
Dept: GENERAL RADIOLOGY | Facility: HOSPITAL | Age: 80
Discharge: HOME | End: 2020-07-28
Admitting: INTERNAL MEDICINE

## 2020-07-28 ENCOUNTER — TRANSCRIBE ORDERS (OUTPATIENT)
Dept: ADMINISTRATIVE | Facility: HOSPITAL | Age: 80
End: 2020-07-28

## 2020-07-28 DIAGNOSIS — R52 PAIN: Primary | ICD-10-CM

## 2020-07-28 PROCEDURE — 72110 X-RAY EXAM L-2 SPINE 4/>VWS: CPT

## 2020-09-08 ENCOUNTER — APPOINTMENT (OUTPATIENT)
Dept: GENERAL RADIOLOGY | Facility: HOSPITAL | Age: 80
End: 2020-09-08

## 2020-09-08 ENCOUNTER — APPOINTMENT (OUTPATIENT)
Dept: CT IMAGING | Facility: HOSPITAL | Age: 80
End: 2020-09-08

## 2020-09-08 ENCOUNTER — HOSPITAL ENCOUNTER (INPATIENT)
Facility: HOSPITAL | Age: 80
LOS: 7 days | Discharge: NURSING, REHAB OR LONG-TERM CARE | End: 2020-09-15
Attending: EMERGENCY MEDICINE | Admitting: FAMILY MEDICINE

## 2020-09-08 DIAGNOSIS — E87.1 ACUTE HYPONATREMIA: Primary | ICD-10-CM

## 2020-09-08 DIAGNOSIS — I10 ELEVATED BLOOD PRESSURE READING WITH DIAGNOSIS OF HYPERTENSION: ICD-10-CM

## 2020-09-08 DIAGNOSIS — G89.29 OTHER CHRONIC PAIN: ICD-10-CM

## 2020-09-08 DIAGNOSIS — R29.6 FREQUENT FALLS: ICD-10-CM

## 2020-09-08 DIAGNOSIS — Z74.09 IMPAIRED MOBILITY AND ADLS: ICD-10-CM

## 2020-09-08 DIAGNOSIS — R91.1 LUNG NODULE: ICD-10-CM

## 2020-09-08 DIAGNOSIS — R09.02 HYPOXEMIA: ICD-10-CM

## 2020-09-08 DIAGNOSIS — R41.82 ACUTE ON CHRONIC ALTERATION IN MENTAL STATUS: ICD-10-CM

## 2020-09-08 DIAGNOSIS — Z78.9 IMPAIRED MOBILITY AND ADLS: ICD-10-CM

## 2020-09-08 PROBLEM — R79.89 ELEVATED LFTS: Status: ACTIVE | Noted: 2020-09-08

## 2020-09-08 PROBLEM — N39.0 ACUTE UTI (URINARY TRACT INFECTION): Status: ACTIVE | Noted: 2020-09-08

## 2020-09-08 PROBLEM — N17.9 AKI (ACUTE KIDNEY INJURY) (HCC): Status: ACTIVE | Noted: 2020-09-08

## 2020-09-08 PROBLEM — E78.5 HLD (HYPERLIPIDEMIA): Status: ACTIVE | Noted: 2020-09-08
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
## 2020-09-08 PROBLEM — I48.91 A-FIB (HCC): Status: ACTIVE | Noted: 2020-09-08

## 2020-09-08 PROBLEM — R53.1 GENERALIZED WEAKNESS: Status: ACTIVE | Noted: 2020-09-08

## 2020-09-08 PROBLEM — K44.9 HIATAL HERNIA: Status: ACTIVE | Noted: 2020-09-08

## 2020-09-08 PROBLEM — F32.A DEPRESSION: Status: ACTIVE | Noted: 2020-09-08

## 2020-09-08 LAB
ABO GROUP BLD: NORMAL
ALBUMIN SERPL-MCNC: 4.4 G/DL (ref 3.5–5.2)
ALBUMIN/GLOB SERPL: 1.5 G/DL
ALP SERPL-CCNC: 206 U/L (ref 39–117)
ALT SERPL W P-5'-P-CCNC: 114 U/L (ref 1–33)
ANION GAP SERPL CALCULATED.3IONS-SCNC: 12 MMOL/L (ref 5–15)
ARTERIAL PATENCY WRIST A: ABNORMAL
AST SERPL-CCNC: 121 U/L (ref 1–32)
ATMOSPHERIC PRESS: ABNORMAL MM[HG]
BACTERIA UR QL AUTO: NORMAL /HPF
BASE EXCESS BLDA CALC-SCNC: -0.9 MMOL/L (ref 0–2)
BASOPHILS # BLD AUTO: 0.04 10*3/MM3 (ref 0–0.2)
BASOPHILS NFR BLD AUTO: 0.5 % (ref 0–1.5)
BDY SITE: ABNORMAL
BILIRUB SERPL-MCNC: 2.2 MG/DL (ref 0–1.2)
BILIRUB UR QL STRIP: NEGATIVE
BLD GP AB SCN SERPL QL: NEGATIVE
BODY TEMPERATURE: 37 C
BUN SERPL-MCNC: 28 MG/DL (ref 8–23)
BUN/CREAT SERPL: 25 (ref 7–25)
CALCIUM SPEC-SCNC: 9.8 MG/DL (ref 8.6–10.5)
CHLORIDE SERPL-SCNC: 81 MMOL/L (ref 98–107)
CLARITY UR: CLEAR
CO2 BLDA-SCNC: 23.6 MMOL/L (ref 22–33)
CO2 SERPL-SCNC: 23 MMOL/L (ref 22–29)
COHGB MFR BLD: -0.1 % (ref 0–2)
COLOR UR: YELLOW
CREAT SERPL-MCNC: 1.12 MG/DL (ref 0.57–1)
D-LACTATE SERPL-SCNC: 1.1 MMOL/L (ref 0.5–2)
DEPRECATED RDW RBC AUTO: 45.1 FL (ref 37–54)
DIGOXIN SERPL-MCNC: <0.3 NG/ML (ref 0.6–1.2)
EOSINOPHIL # BLD AUTO: 0.03 10*3/MM3 (ref 0–0.4)
EOSINOPHIL NFR BLD AUTO: 0.3 % (ref 0.3–6.2)
ERYTHROCYTE [DISTWIDTH] IN BLOOD BY AUTOMATED COUNT: 15.4 % (ref 12.3–15.4)
GFR SERPL CREATININE-BSD FRML MDRD: 47 ML/MIN/1.73
GLOBULIN UR ELPH-MCNC: 3 GM/DL
GLUCOSE SERPL-MCNC: 113 MG/DL (ref 65–99)
GLUCOSE UR STRIP-MCNC: NEGATIVE MG/DL
HAV IGM SERPL QL IA: NORMAL
HBV CORE IGM SERPL QL IA: NORMAL
HBV SURFACE AG SERPL QL IA: NORMAL
HCO3 BLDA-SCNC: 22.6 MMOL/L (ref 20–26)
HCT VFR BLD AUTO: 37.4 % (ref 34–46.6)
HCT VFR BLD CALC: 35.7 %
HCV AB SER DONR QL: NORMAL
HGB BLD-MCNC: 12 G/DL (ref 12–15.9)
HGB BLDA-MCNC: 11.7 G/DL (ref 14–18)
HGB UR QL STRIP.AUTO: NEGATIVE
HOLD SPECIMEN: NORMAL
HOLD SPECIMEN: NORMAL
HYALINE CASTS UR QL AUTO: NORMAL /LPF
IMM GRANULOCYTES # BLD AUTO: 0.16 10*3/MM3 (ref 0–0.05)
IMM GRANULOCYTES NFR BLD AUTO: 1.9 % (ref 0–0.5)
INHALED O2 CONCENTRATION: 28 %
KETONES UR QL STRIP: NEGATIVE
LEUKOCYTE ESTERASE UR QL STRIP.AUTO: NEGATIVE
LYMPHOCYTES # BLD AUTO: 0.8 10*3/MM3 (ref 0.7–3.1)
LYMPHOCYTES NFR BLD AUTO: 9.3 % (ref 19.6–45.3)
MCH RBC QN AUTO: 26 PG (ref 26.6–33)
MCHC RBC AUTO-ENTMCNC: 32.1 G/DL (ref 31.5–35.7)
MCV RBC AUTO: 81 FL (ref 79–97)
METHGB BLD QL: 0.4 % (ref 0–1.5)
MODALITY: ABNORMAL
MONOCYTES # BLD AUTO: 1.22 10*3/MM3 (ref 0.1–0.9)
MONOCYTES NFR BLD AUTO: 14.2 % (ref 5–12)
NEUTROPHILS NFR BLD AUTO: 6.34 10*3/MM3 (ref 1.7–7)
NEUTROPHILS NFR BLD AUTO: 73.8 % (ref 42.7–76)
NITRITE UR QL STRIP: POSITIVE
NOTE: ABNORMAL
NRBC BLD AUTO-RTO: 0 /100 WBC (ref 0–0.2)
NT-PROBNP SERPL-MCNC: 1159 PG/ML (ref 0–1800)
OSMOLALITY SERPL: 260 MOSM/KG (ref 275–295)
OSMOLALITY UR: 471 MOSM/KG (ref 300–1100)
OXYHGB MFR BLDV: 94.2 % (ref 94–99)
PCO2 BLDA: 32.8 MM HG (ref 35–45)
PCO2 TEMP ADJ BLD: 32.8 MM HG (ref 35–45)
PH BLDA: 7.45 PH UNITS (ref 7.35–7.45)
PH UR STRIP.AUTO: 6 [PH] (ref 5–8)
PH, TEMP CORRECTED: 7.45 PH UNITS
PLATELET # BLD AUTO: 250 10*3/MM3 (ref 140–450)
PMV BLD AUTO: 9.8 FL (ref 6–12)
PO2 BLDA: 83.9 MM HG (ref 83–108)
PO2 TEMP ADJ BLD: 83.9 MM HG (ref 83–108)
POTASSIUM SERPL-SCNC: 4.6 MMOL/L (ref 3.5–5.2)
PROCALCITONIN SERPL-MCNC: 0.17 NG/ML (ref 0–0.25)
PROT SERPL-MCNC: 7.4 G/DL (ref 6–8.5)
PROT UR QL STRIP: NEGATIVE
RBC # BLD AUTO: 4.62 10*6/MM3 (ref 3.77–5.28)
RBC # UR: NORMAL /HPF
REF LAB TEST METHOD: NORMAL
RH BLD: NEGATIVE
SODIUM SERPL-SCNC: 116 MMOL/L (ref 136–145)
SODIUM UR-SCNC: 72 MMOL/L
SP GR UR STRIP: 1.01 (ref 1–1.03)
SQUAMOUS #/AREA URNS HPF: NORMAL /HPF
T&S EXPIRATION DATE: NORMAL
TROPONIN T SERPL-MCNC: <0.01 NG/ML (ref 0–0.03)
UROBILINOGEN UR QL STRIP: ABNORMAL
VENTILATOR MODE: ABNORMAL
WBC # BLD AUTO: 8.59 10*3/MM3 (ref 3.4–10.8)
WBC UR QL AUTO: NORMAL /HPF
WHOLE BLOOD HOLD SPECIMEN: NORMAL
WHOLE BLOOD HOLD SPECIMEN: NORMAL

## 2020-09-08 PROCEDURE — 85025 COMPLETE CBC W/AUTO DIFF WBC: CPT | Performed by: PHYSICIAN ASSISTANT

## 2020-09-08 PROCEDURE — 99284 EMERGENCY DEPT VISIT MOD MDM: CPT

## 2020-09-08 PROCEDURE — 36600 WITHDRAWAL OF ARTERIAL BLOOD: CPT

## 2020-09-08 PROCEDURE — 99223 1ST HOSP IP/OBS HIGH 75: CPT | Performed by: FAMILY MEDICINE

## 2020-09-08 PROCEDURE — 81001 URINALYSIS AUTO W/SCOPE: CPT | Performed by: PHYSICIAN ASSISTANT

## 2020-09-08 PROCEDURE — 82805 BLOOD GASES W/O2 SATURATION: CPT

## 2020-09-08 PROCEDURE — U0004 COV-19 TEST NON-CDC HGH THRU: HCPCS | Performed by: EMERGENCY MEDICINE

## 2020-09-08 PROCEDURE — 86850 RBC ANTIBODY SCREEN: CPT | Performed by: EMERGENCY MEDICINE

## 2020-09-08 PROCEDURE — 84484 ASSAY OF TROPONIN QUANT: CPT | Performed by: EMERGENCY MEDICINE

## 2020-09-08 PROCEDURE — 83930 ASSAY OF BLOOD OSMOLALITY: CPT | Performed by: NURSE PRACTITIONER

## 2020-09-08 PROCEDURE — 86901 BLOOD TYPING SEROLOGIC RH(D): CPT | Performed by: EMERGENCY MEDICINE

## 2020-09-08 PROCEDURE — 25010000002 CEFTRIAXONE PER 250 MG: Performed by: NURSE PRACTITIONER

## 2020-09-08 PROCEDURE — 87086 URINE CULTURE/COLONY COUNT: CPT | Performed by: NURSE PRACTITIONER

## 2020-09-08 PROCEDURE — P9612 CATHETERIZE FOR URINE SPEC: HCPCS

## 2020-09-08 PROCEDURE — 83605 ASSAY OF LACTIC ACID: CPT

## 2020-09-08 PROCEDURE — 86900 BLOOD TYPING SEROLOGIC ABO: CPT | Performed by: EMERGENCY MEDICINE

## 2020-09-08 PROCEDURE — 71250 CT THORAX DX C-: CPT

## 2020-09-08 PROCEDURE — 83880 ASSAY OF NATRIURETIC PEPTIDE: CPT | Performed by: EMERGENCY MEDICINE

## 2020-09-08 PROCEDURE — 80053 COMPREHEN METABOLIC PANEL: CPT | Performed by: PHYSICIAN ASSISTANT

## 2020-09-08 PROCEDURE — 84145 PROCALCITONIN (PCT): CPT | Performed by: EMERGENCY MEDICINE

## 2020-09-08 PROCEDURE — 83935 ASSAY OF URINE OSMOLALITY: CPT | Performed by: NURSE PRACTITIONER

## 2020-09-08 PROCEDURE — 93005 ELECTROCARDIOGRAM TRACING: CPT | Performed by: EMERGENCY MEDICINE

## 2020-09-08 PROCEDURE — 80162 ASSAY OF DIGOXIN TOTAL: CPT | Performed by: PHYSICIAN ASSISTANT

## 2020-09-08 PROCEDURE — 84300 ASSAY OF URINE SODIUM: CPT | Performed by: NURSE PRACTITIONER

## 2020-09-08 PROCEDURE — 80074 ACUTE HEPATITIS PANEL: CPT | Performed by: NURSE PRACTITIONER

## 2020-09-08 RX ORDER — CEFUROXIME AXETIL 250 MG/1
250 TABLET ORAL 2 TIMES DAILY
COMMUNITY
Start: 2020-09-06 | End: 2020-09-15 | Stop reason: HOSPADM

## 2020-09-08 RX ORDER — AMIODARONE HYDROCHLORIDE 200 MG/1
200 TABLET ORAL DAILY
Status: DISCONTINUED | OUTPATIENT
Start: 2020-09-09 | End: 2020-09-15 | Stop reason: HOSPADM

## 2020-09-08 RX ORDER — DOCUSATE SODIUM 100 MG/1
100 CAPSULE, LIQUID FILLED ORAL 2 TIMES DAILY PRN
Status: DISCONTINUED | OUTPATIENT
Start: 2020-09-08 | End: 2020-09-15 | Stop reason: HOSPADM

## 2020-09-08 RX ORDER — SODIUM CHLORIDE 0.9 % (FLUSH) 0.9 %
10 SYRINGE (ML) INJECTION AS NEEDED
Status: DISCONTINUED | OUTPATIENT
Start: 2020-09-08 | End: 2020-09-15 | Stop reason: HOSPADM

## 2020-09-08 RX ORDER — SODIUM CHLORIDE 0.9 % (FLUSH) 0.9 %
10 SYRINGE (ML) INJECTION EVERY 12 HOURS SCHEDULED
Status: DISCONTINUED | OUTPATIENT
Start: 2020-09-08 | End: 2020-09-15 | Stop reason: HOSPADM

## 2020-09-08 RX ORDER — PRAMIPEXOLE DIHYDROCHLORIDE 0.25 MG/1
1 TABLET ORAL 2 TIMES DAILY
Status: DISCONTINUED | OUTPATIENT
Start: 2020-09-08 | End: 2020-09-14

## 2020-09-08 RX ORDER — TRIAMTERENE AND HYDROCHLOROTHIAZIDE 75; 50 MG/1; MG/1
1 TABLET ORAL DAILY
COMMUNITY
End: 2020-09-15 | Stop reason: HOSPADM

## 2020-09-08 RX ORDER — OXYBUTYNIN CHLORIDE 10 MG/1
10 TABLET, EXTENDED RELEASE ORAL DAILY
COMMUNITY
End: 2020-09-15 | Stop reason: HOSPADM

## 2020-09-08 RX ORDER — METOPROLOL TARTRATE 50 MG/1
50 TABLET, FILM COATED ORAL 2 TIMES DAILY
Status: DISCONTINUED | OUTPATIENT
Start: 2020-09-08 | End: 2020-09-15 | Stop reason: HOSPADM

## 2020-09-08 RX ADMIN — PRAMIPEXOLE DIHYDROCHLORIDE 1 MG: 1 TABLET ORAL at 21:47

## 2020-09-08 RX ADMIN — CEFTRIAXONE SODIUM 1 G: 1 INJECTION, POWDER, FOR SOLUTION INTRAMUSCULAR; INTRAVENOUS at 21:46

## 2020-09-08 NOTE — H&P
[facility] Medicine Services  HISTORY AND PHYSICAL    Patient Name: Zhanna Mccabe  : 1940  MRN: [id number]  Primary Care Physician: Mackenzie Whitten MD  Date of admission: 2020      Subjective   Subjective     Chief Complaint:  Back pain, dysuria    HPI:  Zhanna Mccabe is a 80 y.o. female with a history of afib, HTN, HLD, IBS, RLS, breast cancer, depression, wears oxygen at night at 3L for nocturnal hypoxia, recent thoracic and lumbar kyphoplasties by Dr. Terrazas at [place],  presents to the ED with complaints of low back pain, urinary frequency and urgency, and confusion.  Daughter reports that the patient was started on two new medications last week (triamterene and oxybutynin) and cefuroxime was started on  for UTI.  Ever since starting the new medications the patient has been falling a lot at home including 4 times in the last week.  A couple of days ago she did hit her head with no loss of consciousness, but since the fall has been having increased lower back pain worsened with movement.  Daughter also states that her mother has been experiencing confusion and hallucinations since starting the new medications.  Over the last couple of weeks she has been drinking large amounts of water and eating ice all throughout the day and night.  Patient complains of loss of appetite, fatigue, dyspnea with exertion, nausea, vomiting (last night), urinary frequency, urinary urgency, and urinary incontinence.  No fevers, chills, cough, chest pain, diarrhea, or any other complaints at this time.  CT of the chest shows moderate to large hiatal hernia and mild associated atelectasis, trace interstitial disease in the lingula, 8 mm nodule in the lingula, and acute superior endplate compression deformity of L3.  Labs:  Na 116, glucose 113, BUN 28, creatinine 1.12, ALK Phos 206, , .  UA + nitrite  Patient is being admitted to the Hospitalist for further evaluation  and management.    Review of Systems   Constitutional: Positive for appetite change and fatigue. Negative for chills, diaphoresis and fever.   HENT: Negative for congestion, rhinorrhea and sore throat.    Eyes: Negative.    Respiratory: Positive for shortness of breath (with exertion). Negative for cough and wheezing.    Cardiovascular: Negative.    Gastrointestinal: Positive for constipation, nausea and vomiting. Negative for abdominal distention, abdominal pain and diarrhea.        N/v over the last week   Endocrine: Positive for polydipsia.        Waking up in the night drinking lots of water.  Over the last couple of weeks has been drinking large amounts of water daily   Genitourinary: Positive for frequency and urgency. Negative for difficulty urinating, dysuria, flank pain, hematuria and pelvic pain.        Dark urine, urinary incontinence    Musculoskeletal: Positive for back pain (LBP with movement). Negative for neck pain and neck stiffness.   Skin: Negative.    Neurological: Positive for weakness (generalized). Negative for dizziness, syncope, light-headedness and headaches.   Hematological: Negative.    Psychiatric/Behavioral: Positive for confusion and hallucinations.          Personal History     Past Medical History:   Diagnosis Date   • A-fib (CMS/HCC)    • Breast cancer (CMS/HCC) 1989    left breast   • Depression    • High cholesterol    • Hypertension    • IBS (irritable bowel syndrome)    • Restless leg        Past Surgical History:   Procedure Laterality Date   • ARM SKIN LESION BIOPSY / EXCISION      melanoma   • BLADDER REPAIR      prolapse   • BREAST BIOPSY     • CHOLECYSTECTOMY     • HAND SURGERY Right     4th & 5th fingers   • HYSTERECTOMY  1983   • HYSTERECTOMY     • KNEE SURGERY Left     ORIF   • MASTECTOMY Left 1989   • MASTECTOMY         Family History: family history includes Breast cancer (age of onset: 68) in her maternal aunt and maternal aunt; Cancer in her father and mother;  Hypertension in her mother. Otherwise pertinent FHx was reviewed and unremarkable.     Social History:  reports that she has never smoked. She has never used smokeless tobacco. She reports that she does not drink alcohol or use drugs.  Social History     Social History Narrative   • Not on file       Medications:  Available home medication information reviewed.  Medications Prior to Admission   Medication Sig Dispense Refill Last Dose   • amiodarone (PACERONE) 200 MG tablet Take 200 mg by mouth Daily.      • cefuroxime (CEFTIN) 250 MG tablet Take 250 mg by mouth 2 (Two) Times a Day.      • docusate sodium (COLACE) 100 MG capsule Take 100 mg by mouth 2 (Two) Times a Day As Needed.  1 Taking   • DULoxetine (CYMBALTA) 30 MG capsule Take 30 mg by mouth Daily.      • HYDROcodone-acetaminophen (NORCO) 7.5-325 MG per tablet Take 1 tablet by mouth Every 6 (Six) Hours As Needed for Moderate Pain .      • metoprolol tartrate (LOPRESSOR) 25 MG tablet Take 50 mg by mouth 2 (Two) Times a Day.  6 Taking   • oxybutynin XL (DITROPAN-XL) 10 MG 24 hr tablet Take 10 mg by mouth Daily.      • pramipexole (MIRAPEX) 0.5 MG tablet Take 1 mg by mouth 2 (two) times a day.  2 Taking   • triamterene-hydrochlorothiazide (MAXZIDE) 75-50 MG per tablet Take 1 tablet by mouth Daily.      • XARELTO 20 MG tablet Take 20 mg by mouth Daily With Dinner.  5 Taking       Allergies   Allergen Reactions   • Abilify [Aripiprazole] Swelling     Of mouth and lips   • Ambien [Zolpidem] Other (See Comments)     Sleep walks   • Lyrica [Pregabalin] Swelling     Of mouth and lips       Objective   Objective     Vital Signs:   Temp:  [97.1 °F (36.2 °C)-98.1 °F (36.7 °C)] 98.1 °F (36.7 °C)  Heart Rate:  [52-59] 59  Resp:  [18] 18  BP: (112-155)/(51-66) 139/51        Physical Exam   Constitutional: She is oriented to person, place, and time. She appears well-developed. No distress.   HENT:   Head: Normocephalic.   Eyes: Pupils are equal, round, and reactive to light.    Neck: Normal range of motion. Neck supple.   Cardiovascular: Regular rhythm, normal heart sounds and intact distal pulses. Exam reveals no gallop and no friction rub.   No murmur heard.  bradycardia   Pulmonary/Chest: Effort normal and breath sounds normal. No stridor. No respiratory distress. She has no wheezes. She has no rales.   Abdominal: Soft. Bowel sounds are normal. She exhibits no distension and no mass. There is no tenderness. There is no rebound and no guarding.   Neurological: She is alert and oriented to person, place, and time. No cranial nerve deficit.   Skin: She is not diaphoretic.          Results Reviewed:  I have personally reviewed current lab and radiology data.    Results from last 7 days   Lab Units 09/08/20  1438   WBC 10*3/mm3 8.59   HEMOGLOBIN g/dL 12.0   HEMATOCRIT % 37.4   PLATELETS 10*3/mm3 250     Results from last 7 days   Lab Units 09/08/20  1438   SODIUM mmol/L 116*   POTASSIUM mmol/L 4.6   CHLORIDE mmol/L 81*   CO2 mmol/L 23.0   BUN mg/dL 28*   CREATININE mg/dL 1.12*   GLUCOSE mg/dL 113*   CALCIUM mg/dL 9.8   ALT (SGPT) U/L 114*   AST (SGOT) U/L 121*   TROPONIN T ng/mL <0.010   PROBNP pg/mL 1,159.0   LACTATE mmol/L 1.1   PROCALCITONIN ng/mL 0.17     Estimated Creatinine Clearance: 37.9 mL/min (A) (by C-G formula based on SCr of 1.12 mg/dL (H)).  Brief Urine Lab Results  (Last result in the past 365 days)      Color   Clarity   Blood   Leuk Est   Nitrite   Protein   CREAT   Urine HCG        09/08/20 1650 Yellow Clear Negative Negative Positive Negative             Imaging Results (Last 24 Hours)     Procedure Component Value Units Date/Time    CT Chest Without Contrast [[id number]] Collected:  09/08/20 1810     Updated:  09/08/20 1819    Narrative:       EXAMINATION: CT CHEST WO CONTRAST-      INDICATION: generalized weakness and hypoxemia; E87.3-Dstt-pfigticadd  and hyponatremia; R29.6-Repeated falls; R41.82-Altered mental status,  unspecified; R09.02-Hypoxemia; I10-Essential  (primary) hypertension     TECHNIQUE: Spiral acquisition 5 mm axial images through the chest and  upper abdomen     The radiation dose reduction device was turned on for each scan per the  ALARA (As Low as Reasonably Achievable) protocol.     COMPARISON: No recent comparison exams 0407 2005 chest CT scan     FINDINGS: History indicates increasing dyspnea X2 days.     There appears to be some chronic left lung volume loss similar to the  prior study. There is mild left lower lobe bronchiectasis associated  with a small area of atelectasis along the left upper margin of the  patient's large hiatal hernia. Small focus of interstitial disease is  seen in the inferior tip of the lingula and may be either acute or  chronic but is minimal in extent. There is a new pleural-based nodule, 8  mm in diameter in the lingula. Left lung otherwise appears clear. Trace  linear scarring in the right lung apex is stable. Right lung otherwise  appears clear except for a calcified upper lobe granuloma. There is no  pleural effusion. Hiatal hernia is approximately 8.56 m in diameter.  Mediastinal window images show significant reflux into the upper  esophagus. No mediastinal mass adenopathy or pericardial effusion is  appreciated. There is extensive coronary artery calcification.     Included images of the upper abdomen show no significant abnormalities  of the visualized portions of the liver, spleen, pancreas, adrenal  glands, or upper renal poles. Gallbladder appears to surgically absent.  No upper abdominal free air or ascites or adenopathy is seen. There are  lower thoracic and upper lumbar kyphoplasty is, and what appears to be a  potential acute fracture at what appears to be L3. Please refer to  sagittal image 71 series 900.             Impression:       1. Moderate to large hiatal hernia and mild associated atelectasis.  2. Trace interstitial disease in the lingula most likely some  generalized atelectasis. Inflammation, if  present, is minimal.  3. Round and rather bland appearing pleural-based 8 mm nodule in the  lingula, but new since 2005. Consider follow-up through [facility]  pulmonary nodule clinic.  4. Incidentally noted previous thoracic and lumbar kyphoplasties and  what appears to be acute superior endplate compression deformity of L3.                   Assessment/Plan   Assessment & Plan     Active Hospital Problems    Diagnosis POA   • **Acute hyponatremia [E87.1] Yes   • HTN (hypertension) [I10] Yes   • HLD (hyperlipidemia) [E78.5] Unknown   • Depression [F32.9] Unknown   • A-fib (CMS/HCC) [I48.91] Unknown   • Fall [W19.XXXA] Unknown   • Generalized weakness [R53.1] Unknown   • Lung nodule [R91.1] Unknown   • Hiatal hernia [K44.9] Unknown   • Elevated LFTs [R79.89] Unknown   • HALLIE (acute kidney injury) (CMS/HCC) [N17.9] Yes   • Acute UTI (urinary tract infection) [N39.0] Yes   • Hypoxia [R09.02] Unknown     Zhanna Mccabe is a 80 y.o. female with a history of afib, HTN, HLD, IBS, RLS, breast cancer, depression, wears oxygen at night at 3L for nocturnal hypoxia, recent thoracic and lumbar kyphoplasties by Dr. Terrazas at [place],  presents to the ED with complaints of low back pain, urinary frequency and urgency, and confusion.  Daughter reports that the patient was started on two new medications last week (triamterene and oxybutynin) and cefuroxime was started on Sunday for UTI.  Ever since starting the new medications the patient has been falling a lot at home including 4 times in the last week.      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  --continuous pulse ox  --supplemental oxygen to keep O2 sat >90%  --wears BiPAP at night  --follow up through [facility] pulmonary nodule clinic at discharge    Frequent falls  Generalized weakness  Encephalopathy  --fall precautions  --consult pt/ot in the am  --consult case management in the am  --neuro checks    Acute endplate compression deformity of L3  --thoracic and lumbar kyphoplasties by Dr. Terrazas at [place],   --obtain medical records from [place]  --consult neurosurgery in the am    Acute UTI  --was started on cefuroxime on Sunday  --rocephin  --urine culture    Elevated LFT's  --hepatitis panel  --Obtain RUQ US in AM (NPO after midnight)  --CMP in AM    Afib  HTN  HLD  --hold Xarelto until seen by neurosurgery tomorrow  --hold maxzide d/t hyponatremia  --continue metoprolol and amiodarone    Depression  --hold cymbalta d/t hyponatremia    Hiatal Hernia  --CT shows moderate to large hiatal hernia      DVT prophylaxis:  mechanical      CODE STATUS:  Full Code   Code Status and Medical Interventions:   Ordered at: 09/08/20 2035     Level Of Support Discussed With:    Patient     Code Status:    CPR     Medical Interventions (Level of Support Prior to Arrest):    Full       Admission Status:  I believe this patient meets INPATIENT status due to hyponatremia, HALLIE, UTI.  I feel patient’s risk for adverse outcomes and need for care warrant INPATIENT evaluation and I predict the patient’s care encounter to likely last beyond 2 midnights.    Electronically signed by FÉLIX Arevalo, 09/08/20, 7:17 PM.      Attending   Admission Attestation       I have seen and examined the patient, performing an independent face-to-face diagnostic evaluation with plan of care reviewed and developed with the advanced practice clinician (APC).      Brief Summary Statement:   Zhanna Mccabe is a 80 y.o. female  with a history of afib, HTN, HLD, IBS, RLS, breast cancer, depression, wears oxygen at night at 3L for nocturnal hypoxia, recent thoracic and lumbar kyphoplasties by Dr. Terrazas at [place],  presents to the ED with complaints of low back pain, urinary frequency and urgency, and confusion.  Daughter reports that the patient was started on two new medications last week (triamterene and oxybutynin) and cefuroxime was started on Sunday for UTI.  Ever since starting the new medications the patient has been falling a lot at home including 4 times in the last week. Pt found to have Na of 116 in the ED. Per patient, she drinks water constantly and eats ice. UA nitrite + but also partially antibiotic modified. Pt also with elevated LFTs.     Remainder of detailed HPI is as noted by APC and has been reviewed and/or edited by me for completeness.    Attending Physical Exam:  Constitutional: Awake, alert, NAD  Eyes: PERRLA, sclerae anicteric, no conjunctival injection  HENT: NCAT, mucous membranes moist  Neck: Supple, no thyromegaly, no lymphadenopathy, trachea midline  Respiratory: Clear to auscultation bilaterally, nonlabored respirations   Cardiovascular: RRR, no murmurs, rubs, or gallops, palpable pedal pulses bilaterally  Gastrointestinal: Positive bowel sounds, soft, nontender, nondistended  Musculoskeletal: No bilateral ankle edema, no clubbing or cyanosis to extremities  Psychiatric: Appropriate affect, cooperative  Neurologic: Oriented x 3, RUIZ spontaneously, speech clear  Skin: No rashes to exposed skin    Brief Assessment/Plan :  See detailed assessment and plan developed with APC which I have reviewed and/or edited for completeness.    Electronically signed by Randi Christensen DO, 09/08/20, 10:06 PM.

## 2020-09-08 NOTE — PROGRESS NOTES
Discharge Planning Assessment  [facility]     Patient Name: Zhanna Mccabe  MRN: [id number]  Today's Date: 9/8/2020    Admit Date: 9/8/2020    Discharge Needs Assessment     Row Name 09/08/20 1712       Living Environment    Lives With alone    Name(s) of Who Lives With Patient Lives alone in [facility].  Adult daughter has been living with her for last several years due to patient health issues, Jia Bridges @ [phone].    Current Living Arrangements home/apartment/condo    Primary Care Provided by self;child(kishore)    Provides Primary Care For no one, unable/limited ability to care for self    Caregiving Concerns Adult daughter lives with patient    Family Caregiver if Needed child(kishore), adult    Family Caregiver Names Adult daughter, Jia Bridges    Quality of Family Relationships helpful;involved;supportive    Able to Return to Prior Arrangements yes    Living Arrangement Comments Resides in private residence with daughter       Resource/Environmental Concerns    Resource/Environmental Concerns none       Transition Planning    Patient/Family Anticipates Transition to home with family;home with help/services-[facility]    Patient/Family Anticipated Services at Transition ;home health care        Discharge Plan     Row Name 09/08/20 1731       Plan    Plan TBD    Patient/Family in Agreement with Plan  -- Daughter    Plan Comments Planning hospital admission today, spoke with daughter Jia.  Reports she has been living with patient for last several years due to her, (patient), health issues  Patient with [facility]-daughter thought they may have been ready to d/c services and would like services restarted/continued at d/c.  Daughter reports mom was at [facility] in June for rehab, ambulates with rollator.    Final Discharge Disposition Code 06 - home with home health care        Destination      Coordination has not been started for this encounter.      Durable Medical  Equipment      Coordination has not been started for this encounter.      Dialysis/Infusion      Coordination has not been started for this encounter.      Home Medical Care      Coordination has not been started for this encounter.      Therapy      Coordination has not been started for this encounter.      Community Resources      Coordination has not been started for this encounter.          Demographic Summary     Row Name 09/08/20 9907       General Information    Arrived From  emergency department;home    Referral Source  admission list;emergency department    Reason for Consult  discharge planning    Preferred Language  English     Used During This Interaction  no        Functional Status    No documentation.       Psychosocial    No documentation.       Abuse/Neglect    No documentation.       Legal    No documentation.       Substance Abuse    No documentation.       Patient Forms    No documentation.           AUDREY Corona

## 2020-09-09 ENCOUNTER — APPOINTMENT (OUTPATIENT)
Dept: ULTRASOUND IMAGING | Facility: HOSPITAL | Age: 80
End: 2020-09-09

## 2020-09-09 ENCOUNTER — APPOINTMENT (OUTPATIENT)
Dept: MRI IMAGING | Facility: HOSPITAL | Age: 80
End: 2020-09-09

## 2020-09-09 LAB
ALBUMIN SERPL-MCNC: 3.8 G/DL (ref 3.5–5.2)
ALBUMIN/GLOB SERPL: 1.4 G/DL
ALP SERPL-CCNC: 178 U/L (ref 39–117)
ALT SERPL W P-5'-P-CCNC: 111 U/L (ref 1–33)
ANION GAP SERPL CALCULATED.3IONS-SCNC: 10 MMOL/L (ref 5–15)
AST SERPL-CCNC: 107 U/L (ref 1–32)
BACTERIA SPEC AEROBE CULT: NO GROWTH
BASOPHILS # BLD AUTO: 0.04 10*3/MM3 (ref 0–0.2)
BASOPHILS NFR BLD AUTO: 0.6 % (ref 0–1.5)
BILIRUB SERPL-MCNC: 1.3 MG/DL (ref 0–1.2)
BUN SERPL-MCNC: 27 MG/DL (ref 8–23)
BUN/CREAT SERPL: 29 (ref 7–25)
CALCIUM SPEC-SCNC: 8.9 MG/DL (ref 8.6–10.5)
CHLORIDE SERPL-SCNC: 86 MMOL/L (ref 98–107)
CO2 SERPL-SCNC: 24 MMOL/L (ref 22–29)
CREAT SERPL-MCNC: 0.93 MG/DL (ref 0.57–1)
DEPRECATED RDW RBC AUTO: 44.6 FL (ref 37–54)
EOSINOPHIL # BLD AUTO: 0.03 10*3/MM3 (ref 0–0.4)
EOSINOPHIL NFR BLD AUTO: 0.5 % (ref 0.3–6.2)
ERYTHROCYTE [DISTWIDTH] IN BLOOD BY AUTOMATED COUNT: 15.5 % (ref 12.3–15.4)
GFR SERPL CREATININE-BSD FRML MDRD: 58 ML/MIN/1.73
GLOBULIN UR ELPH-MCNC: 2.8 GM/DL
GLUCOSE SERPL-MCNC: 104 MG/DL (ref 65–99)
HCT VFR BLD AUTO: 33.6 % (ref 34–46.6)
HGB BLD-MCNC: 10.5 G/DL (ref 12–15.9)
HOLD SPECIMEN: NORMAL
IMM GRANULOCYTES # BLD AUTO: 0.1 10*3/MM3 (ref 0–0.05)
IMM GRANULOCYTES NFR BLD AUTO: 1.6 % (ref 0–0.5)
LYMPHOCYTES # BLD AUTO: 0.87 10*3/MM3 (ref 0.7–3.1)
LYMPHOCYTES NFR BLD AUTO: 13.5 % (ref 19.6–45.3)
MCH RBC QN AUTO: 25.2 PG (ref 26.6–33)
MCHC RBC AUTO-ENTMCNC: 31.3 G/DL (ref 31.5–35.7)
MCV RBC AUTO: 80.8 FL (ref 79–97)
MONOCYTES # BLD AUTO: 1.15 10*3/MM3 (ref 0.1–0.9)
MONOCYTES NFR BLD AUTO: 17.9 % (ref 5–12)
NEUTROPHILS NFR BLD AUTO: 4.25 10*3/MM3 (ref 1.7–7)
NEUTROPHILS NFR BLD AUTO: 65.9 % (ref 42.7–76)
NRBC BLD AUTO-RTO: 0 /100 WBC (ref 0–0.2)
PLATELET # BLD AUTO: 218 10*3/MM3 (ref 140–450)
PMV BLD AUTO: 10.6 FL (ref 6–12)
POTASSIUM SERPL-SCNC: 4.2 MMOL/L (ref 3.5–5.2)
PROT SERPL-MCNC: 6.6 G/DL (ref 6–8.5)
RBC # BLD AUTO: 4.16 10*6/MM3 (ref 3.77–5.28)
SARS-COV-2 RNA NOSE QL NAA+PROBE: NOT DETECTED
SODIUM SERPL-SCNC: 119 MMOL/L (ref 136–145)
SODIUM SERPL-SCNC: 120 MMOL/L (ref 136–145)
SODIUM SERPL-SCNC: 122 MMOL/L (ref 136–145)
WBC # BLD AUTO: 6.44 10*3/MM3 (ref 3.4–10.8)

## 2020-09-09 PROCEDURE — 25010000002 LORAZEPAM PER 2 MG: Performed by: HOSPITALIST

## 2020-09-09 PROCEDURE — 84295 ASSAY OF SERUM SODIUM: CPT | Performed by: INTERNAL MEDICINE

## 2020-09-09 PROCEDURE — 80053 COMPREHEN METABOLIC PANEL: CPT | Performed by: NURSE PRACTITIONER

## 2020-09-09 PROCEDURE — 84295 ASSAY OF SERUM SODIUM: CPT | Performed by: NURSE PRACTITIONER

## 2020-09-09 PROCEDURE — 25010000002 CEFTRIAXONE PER 250 MG: Performed by: NURSE PRACTITIONER

## 2020-09-09 PROCEDURE — 25010000002 HALOPERIDOL LACTATE PER 5 MG: Performed by: HOSPITALIST

## 2020-09-09 PROCEDURE — 76705 ECHO EXAM OF ABDOMEN: CPT

## 2020-09-09 PROCEDURE — 85025 COMPLETE CBC W/AUTO DIFF WBC: CPT | Performed by: NURSE PRACTITIONER

## 2020-09-09 PROCEDURE — 84295 ASSAY OF SERUM SODIUM: CPT | Performed by: HOSPITALIST

## 2020-09-09 PROCEDURE — 99221 1ST HOSP IP/OBS SF/LOW 40: CPT | Performed by: PHYSICIAN ASSISTANT

## 2020-09-09 PROCEDURE — 97161 PT EVAL LOW COMPLEX 20 MIN: CPT

## 2020-09-09 PROCEDURE — 99233 SBSQ HOSP IP/OBS HIGH 50: CPT | Performed by: HOSPITALIST

## 2020-09-09 PROCEDURE — 97166 OT EVAL MOD COMPLEX 45 MIN: CPT

## 2020-09-09 RX ORDER — LORAZEPAM 2 MG/ML
1 INJECTION INTRAMUSCULAR EVERY 6 HOURS PRN
Status: DISCONTINUED | OUTPATIENT
Start: 2020-09-09 | End: 2020-09-15 | Stop reason: HOSPADM

## 2020-09-09 RX ORDER — HYDROCODONE BITARTRATE AND ACETAMINOPHEN 7.5; 325 MG/1; MG/1
1 TABLET ORAL EVERY 6 HOURS PRN
Status: DISCONTINUED | OUTPATIENT
Start: 2020-09-09 | End: 2020-09-15 | Stop reason: HOSPADM

## 2020-09-09 RX ORDER — HALOPERIDOL 5 MG/ML
1 INJECTION INTRAMUSCULAR
Status: DISCONTINUED | OUTPATIENT
Start: 2020-09-09 | End: 2020-09-10 | Stop reason: SDUPTHER

## 2020-09-09 RX ADMIN — METOPROLOL TARTRATE 50 MG: 50 TABLET, FILM COATED ORAL at 20:00

## 2020-09-09 RX ADMIN — SODIUM CHLORIDE, PRESERVATIVE FREE 10 ML: 5 INJECTION INTRAVENOUS at 08:34

## 2020-09-09 RX ADMIN — AMIODARONE HYDROCHLORIDE 200 MG: 200 TABLET ORAL at 08:33

## 2020-09-09 RX ADMIN — LORAZEPAM 1 MG: 2 INJECTION INTRAMUSCULAR; INTRAVENOUS at 19:17

## 2020-09-09 RX ADMIN — HYDROCODONE BITARTRATE AND ACETAMINOPHEN 1 TABLET: 7.5; 325 TABLET ORAL at 11:59

## 2020-09-09 RX ADMIN — DOCUSATE SODIUM 100 MG: 100 CAPSULE, LIQUID FILLED ORAL at 11:59

## 2020-09-09 RX ADMIN — CEFTRIAXONE SODIUM 1 G: 1 INJECTION, POWDER, FOR SOLUTION INTRAMUSCULAR; INTRAVENOUS at 20:06

## 2020-09-09 RX ADMIN — METOPROLOL TARTRATE 50 MG: 50 TABLET, FILM COATED ORAL at 08:33

## 2020-09-09 RX ADMIN — PRAMIPEXOLE DIHYDROCHLORIDE 1 MG: 1 TABLET ORAL at 08:34

## 2020-09-09 RX ADMIN — HALOPERIDOL LACTATE 1 MG: 5 INJECTION, SOLUTION INTRAMUSCULAR at 22:11

## 2020-09-09 RX ADMIN — LORAZEPAM 1 MG: 2 INJECTION INTRAMUSCULAR; INTRAVENOUS at 11:59

## 2020-09-09 RX ADMIN — HALOPERIDOL LACTATE 1 MG: 5 INJECTION, SOLUTION INTRAMUSCULAR at 14:17

## 2020-09-09 RX ADMIN — HYDROCODONE BITARTRATE AND ACETAMINOPHEN 1 TABLET: 7.5; 325 TABLET ORAL at 21:35

## 2020-09-09 RX ADMIN — HALOPERIDOL LACTATE 1 MG: 5 INJECTION, SOLUTION INTRAMUSCULAR at 20:02

## 2020-09-09 RX ADMIN — PRAMIPEXOLE DIHYDROCHLORIDE 1 MG: 1 TABLET ORAL at 20:00

## 2020-09-09 NOTE — THERAPY EVALUATION
Patient Name: Zhanna Mccabe  : 1940    MRN: [id number]                              Today's Date: 2020       Admit Date: 2020    Visit Dx:     ICD-10-CM ICD-9-CM   1. Acute hyponatremia E87.1 276.1   2. Frequent falls R29.6 V15.88   3. Acute on chronic alteration in mental status R41.82 780.09   4. Hypoxemia R09.02 799.02   5. Elevated blood pressure reading with diagnosis of hypertension I10 401.9   6. Impaired mobility and ADLs Z74.09 V49.89    Z78.9      Patient Active Problem List   Diagnosis   • Acute hyponatremia   • HTN (hypertension)   • HLD (hyperlipidemia)   • Depression   • A-fib (CMS/HCC)   • Fall   • Generalized weakness   • Lung nodule   • Hiatal hernia   • Elevated LFTs   • HALLIE (acute kidney injury) (CMS/HCC)   • Acute UTI (urinary tract infection)   • Hypoxia     Past Medical History:   Diagnosis Date   • A-fib (CMS/HCC)    • Breast cancer (CMS/HCC)     left breast   • Depression    • High cholesterol    • Hypertension    • IBS (irritable bowel syndrome)    • Restless leg      Past Surgical History:   Procedure Laterality Date   • ARM SKIN LESION BIOPSY / EXCISION      melanoma   • BLADDER REPAIR      prolapse   • BREAST BIOPSY     • CHOLECYSTECTOMY     • HAND SURGERY Right     4th & 5th fingers   • HYSTERECTOMY     • HYSTERECTOMY     • KNEE SURGERY Left     ORIF   • MASTECTOMY Left    • MASTECTOMY       General Information     Row Name 20          PT Evaluation Time/Intention    Document Type  evaluation  -KG     Mode of Treatment  physical therapy  -KG     Row Name 20 1612          General Information    Patient Profile Reviewed?  yes  -KG     Prior Level of Function  independent:;all household mobility;gait;transfer;mod assist:;ADL's;dressing;bathing pt is questionable historian; TBA later  -KG     Existing Precautions/Restrictions  fall  -KG     Barriers to Rehab  medically complex;cognitive status  -KG     Row Name 20 1283           Relationship/Environment    Lives With  child(kishore), adult pt is questionable historian; TBA later  -KG     Row Name 09/09/20 1612          Resource/Environmental Concerns    Current Living Arrangements  home/apartment/condo  -KG     Row Name 09/09/20 1612          Home Main Entrance    Number of Stairs, Main Entrance  two  -KG     Stair Railings, Main Entrance  none  -KG     Row Name 09/09/20 1612          Stairs Within Home, Primary    Number of Stairs, Within Home, Primary  none  -KG     Row Name 09/09/20 1612          Cognitive Assessment/Intervention- PT/OT    Orientation Status (Cognition)  oriented to;person;place  -KG     Cognitive Assessment/Intervention Comment  increased confusion throughout evaluation  -KG     Row Name 09/09/20 1612          Safety Issues, Functional Mobility    Safety Issues Affecting Function (Mobility)  ability to follow commands;awareness of need for assistance;insight into deficits/self awareness;safety precaution awareness;safety precautions follow-through/compliance  -KG     Impairments Affecting Function (Mobility)  balance;cognition;coordination;endurance/activity tolerance;postural/trunk control;strength  -KG       User Key  (r) = Recorded By, (t) = Taken By, (c) = Cosigned By    Initials Name Provider Type    KG Sulema Bethea, PT Physical Therapist        Mobility     Row Name 09/09/20 1614          Bed Mobility Assessment/Treatment    Bed Mobility Assessment/Treatment  supine-sit  -KG     Supine-Sit [place] (Bed Mobility)  minimum assist (75% patient effort);verbal cues  -KG     Assistive Device (Bed Mobility)  bed rails;head of bed elevated  -KG     Comment (Bed Mobility)  VC's for sequencing. Pt required increased time and effort to complete.   -KG     Row Name 09/09/20 1614          Transfer Assessment/Treatment    Comment (Transfers)  VC's for sequencing and safe hand placement. Pt unsteady upon initial stand with increased fear of falling.   -KG     Row Name  09/09/20 1614          Sit-Stand Transfer    Sit-Stand [place] (Transfers)  minimum assist (75% patient effort);verbal cues  -KG     Assistive Device (Sit-Stand Transfers)  walker, front-wheeled  -KG     Row Name 09/09/20 1614          Gait/Stairs Assessment/Training    Gait/Stairs Assessment/Training  gait/ambulation assistive device  -KG     [place] Level (Gait)  minimum assist (75% patient effort);verbal cues  -KG     Assistive Device (Gait)  walker, front-wheeled  -KG     Distance in Feet (Gait)  8  -KG     Pattern (Gait)  step-to  -KG     Deviations/Abnormal Patterns (Gait)  base of support, narrow;nicky decreased;stride length decreased  -KG     Bilateral Gait Deviations  forward flexed posture;heel strike decreased  -KG     Comment (Gait/Stairs)  Pt demonstrated step to gait pattern with slow nicky and decreased step length. VC's for upright posture. Pt required physical assistance to steer RW. Pt's mobility significantly limited by increased confusion and poor safety awareness. Pt with increased fear of falling throughout ambulation.   -KG       User Key  (r) = Recorded By, (t) = Taken By, (c) = Cosigned By    Initials Name Provider Type    KG Sulema Bethea N, PT Physical Therapist        Obj/Interventions     Row Name 09/09/20 1616          General ROM    GENERAL ROM COMMENTS  BLE WFL   -KG     Row Name 09/09/20 1616          MMT (Manual Muscle Testing)    General MMT Comments  BLE grossly 3+/5  -KG     Row Name 09/09/20 1616          Static Sitting Balance    Level of [place] (Unsupported Sitting, Static Balance)  standby assist  -KG     Sitting Position (Unsupported Sitting, Static Balance)  sitting on edge of bed  -KG     Row Name 09/09/20 1616          Static Standing Balance    Level of [place] (Supported Standing, Static Balance)  minimal assist, 75% patient effort  -KG     Assistive Device Utilized (Supported Standing, Static Balance)  walker, rolling  -KG     Row Name  09/09/20 1616          Sensory Assessment/Intervention    Sensory General Assessment  no sensation deficits identified  -KG       User Key  (r) = Recorded By, (t) = Taken By, (c) = Cosigned By    Initials Name Provider Type    Sulema Salinas PT Physical Therapist        Goals/Plan     Row Name 09/09/20 1621          Bed Mobility Goal 1 (PT)    Activity/Assistive Device (Bed Mobility Goal 1, PT)  sit to supine;supine to sit  -KG     [place] Level/Cues Needed (Bed Mobility Goal 1, PT)  standby assist  -KG     Time Frame (Bed Mobility Goal 1, PT)  2 weeks  -KG     Progress/Outcomes (Bed Mobility Goal 1, PT)  goal ongoing  -KG     Row Name 09/09/20 1621          Transfer Goal 1 (PT)    Activity/Assistive Device (Transfer Goal 1, PT)  sit-to-stand/stand-to-sit;bed-to-chair/chair-to-bed;walker, rolling  -KG     [place] Level/Cues Needed (Transfer Goal 1, PT)  contact guard assist  -KG     Time Frame (Transfer Goal 1, PT)  2 weeks  -KG     Progress/Outcome (Transfer Goal 1, PT)  goal ongoing  -KG     Row Name 09/09/20 1621          Gait Training Goal 1 (PT)    Activity/Assistive Device (Gait Training Goal 1, PT)  gait (walking locomotion);assistive device use;walker, rolling  -KG     [place] Level (Gait Training Goal 1, PT)  contact guard assist  -KG     Distance (Gait Goal 1, PT)  150 feet  -KG     Time Frame (Gait Training Goal 1, PT)  2 weeks  -KG     Progress/Outcome (Gait Training Goal 1, PT)  goal ongoing  -KG       User Key  (r) = Recorded By, (t) = Taken By, (c) = Cosigned By    Initials Name Provider Type    Sulema Salinas, PT Physical Therapist        Clinical Impression     Row Name 09/09/20 1616          Pain Assessment    Additional Documentation  Pain Scale: Numbers Pre/Post-Treatment (Group)  -KG     Row Name 09/09/20 1616          Pain Scale: Numbers Pre/Post-Treatment    Pain Scale: Numbers, Pretreatment  0/10 - no pain  -KG     Pain Scale: Numbers, Post-Treatment  0/10  - no pain  -KG     Row Name 09/09/20 1616          Physical Therapy Clinical Impression    Patient/Family Goals Statement (PT Clinical Impression)  return to PLOF  -KG     Criteria for Skilled Interventions Met (PT Clinical Impression)  yes;treatment indicated  -KG     Rehab Potential (PT Clinical Summary)  good, to achieve stated therapy goals  -KG     Row Name 09/09/20 1616          Vital Signs    Pre Systolic BP Rehab  118  -KG     Pre Treatment Diastolic BP  88  -KG     Pretreatment Heart Rate (beats/min)  64  -KG     Posttreatment Heart Rate (beats/min)  65  -KG     O2 Delivery Pre Treatment  room air  -KG     O2 Delivery Post Treatment  room air  -KG     Pre Patient Position  Supine  -KG     Intra Patient Position  Standing  -KG     Post Patient Position  Sitting  -KG     Row Name 09/09/20 1616          Positioning and Restraints    Pre-Treatment Position  in bed  -KG     Post Treatment Position  chair  -KG     In Chair  notified nsg;reclined;call light within reach;encouraged to call for assist;exit alarm on;RUE elevated;LUE elevated;legs elevated  -KG       User Key  (r) = Recorded By, (t) = Taken By, (c) = Cosigned By    Initials Name Provider Type    KG Sulema Bethea, PT Physical Therapist        Outcome Measures     Row Name 09/09/20 1622          How much help from another person do you currently need...    Turning from your back to your side while in flat bed without using bedrails?  3  -KG     Moving from lying on back to sitting on the side of a flat bed without bedrails?  3  -KG     Moving to and from a bed to a chair (including a wheelchair)?  3  -KG     Standing up from a chair using your arms (e.g., wheelchair, bedside chair)?  3  -KG     Climbing 3-5 steps with a railing?  2  -KG     To walk in hospital room?  2  -KG     AM-PAC 6 Clicks Score (PT)  16  -KG     Row Name 09/09/20 1622          Functional Assessment    Outcome Measure Options  AM-PAC 6 Clicks Basic Mobility (PT)  -KG        User Key  (r) = Recorded By, (t) = Taken By, (c) = Cosigned By    Initials Name Provider Type    KG Sulema Bethea, PT Physical Therapist        Physical Therapy Education                 Title: PT OT SLP Therapies (In Progress)     Topic: Physical Therapy (In Progress)     Point: Mobility training (In Progress)     Description:   Instruct learner(s) on safety and technique for assisting patient out of bed, chair or wheelchair.  Instruct in the proper use of assistive devices, such as walker, crutches, cane or brace.              Patient Friendly Description:   It's important to get you on your feet again, but we need to do so in a way that is safe for you. Falling has serious consequences, and your personal safety is the most important thing of all.        When it's time to get out of bed, one of us or a family member will sit next to you on the bed to give you support.     If your doctor or nurse tells you to use a walker, crutches, a cane, or a brace, be sure you use it every time you get out of bed, even if you think you don't need it.    Learning Progress Summary           Patient Acceptance, E, NR by KG at 9/9/2020 1425                   Point: Home exercise program (Not Started)     Description:   Instruct learner(s) on appropriate technique for monitoring, assisting and/or progressing patient with therapeutic exercises and activities.              Learner Progress:   Not documented in this visit.          Point: Body mechanics (In Progress)     Description:   Instruct learner(s) on proper positioning and spine alignment for patient and/or caregiver during mobility tasks and/or exercises.              Learning Progress Summary           Patient Acceptance, E, NR by KG at 9/9/2020 1425                   Point: Precautions (In Progress)     Description:   Instruct learner(s) on prescribed precautions during mobility and gait tasks              Learning Progress Summary           Patient Acceptance, E,  NR by KG at 9/9/2020 1425                               User Key     Initials Effective Dates Name Provider Type Discipline    KG 05/22/20 -  Sulema Bethea, PT Physical Therapist PT              PT Recommendation and Plan  Planned Therapy Interventions (PT Eval): balance training, bed mobility training, gait training, strengthening, transfer training  Outcome Summary/Treatment Plan (PT)  Anticipated Discharge Disposition (PT): skilled nursing facility  Plan of Care Reviewed With: patient  Outcome Summary: PT initial evaluation completed for pt presenting with generalized weakness, increased confusion, and decreased functional mobility. Pt performed STS transfer and ambulated 8ft with RW and Yanni. Pt limited by unsteady gait with increased fear of falling. Pt's decreased independence warrants PT skilled care. Recommend D/C to SNF.     Time Calculation:   PT Charges     Row Name 09/09/20 1425             Time Calculation    Start Time  1425  -KG      PT Received On  09/09/20  -KG      PT Goal Re-Cert Due Date  09/19/20  -KG        User Key  (r) = Recorded By, (t) = Taken By, (c) = Cosigned By    Initials Name Provider Type    KG Sulema Bethea, PT Physical Therapist        Therapy Charges for Today     Code Description Service Date Service Provider Modifiers Qty    61673712277 HC PT EVAL LOW COMPLEXITY 4 9/9/2020 Sulema Bethea, PT GP 1          PT G-Codes  Outcome Measure Options: AM-PAC 6 Clicks Basic Mobility (PT)  AM-PAC 6 Clicks Score (PT): 16  AM-PAC 6 Clicks Score (OT): 17    Mariluz Bethea PT  9/9/2020

## 2020-09-09 NOTE — PROGRESS NOTES
[facility] Medicine Services  PROGRESS NOTE    Patient Name: Zhanna Mccabe  : 1940  MRN: [id number]    Date of Admission: 2020  Primary Care Physician: Mackenzie Whitten MD    Subjective   Subjective     CC:  Weakness/falls    HPI:  Up in bed. Feels better. No f/c. No n/v. No vertigo/dizziness/presyncope. No dyspnea. Denies dysuria now.     Review of Systems   Constitutional: Positive for activity change, appetite change and fatigue.   HENT: Negative.    Respiratory: Negative.    Cardiovascular: Negative.    Genitourinary: Positive for frequency.   Musculoskeletal: Positive for back pain and gait problem.   Neurological: Positive for dizziness and weakness.        Falls   Psychiatric/Behavioral: Positive for confusion and decreased concentration.          Objective   Objective     Vital Signs:   Temp:  [97.1 °F (36.2 °C)-98.1 °F (36.7 °C)] 97.9 °F (36.6 °C)  Heart Rate:  [52-66] 62  Resp:  [18] 18  BP: (112-155)/(51-66) 118/54        Physical Exam:  NAD, alert and oriented x 3  OP clear, dry MM  Neck supple  No LAD  RRR  CTAB  +BS, ND, NT, soft  RUIZ  NO rashes  Normal affect    Results Reviewed:  Results from last 7 days   Lab Units 20  0412 20  1438   WBC 10*3/mm3 6.44 8.59   HEMOGLOBIN g/dL 10.5* 12.0   HEMATOCRIT % 33.6* 37.4   PLATELETS 10*3/mm3 218 250   PROCALCITONIN ng/mL  --  0.17     Results from last 7 days   Lab Units 20  0412 20  0052 20  1438   SODIUM mmol/L 120* 119* 116*   POTASSIUM mmol/L 4.2  --  4.6   CHLORIDE mmol/L 86*  --  81*   CO2 mmol/L 24.0  --  23.0   BUN mg/dL 27*  --  28*   CREATININE mg/dL 0.93  --  1.12*   GLUCOSE mg/dL 104*  --  113*   CALCIUM mg/dL 8.9  --  9.8   ALT (SGPT) U/L 111*  --  114*   AST (SGOT) U/L 107*  --  121*   TROPONIN T ng/mL  --   --  <0.010   PROBNP pg/mL  --   --  1,159.0     Estimated Creatinine Clearance: 45.6 mL/min (by C-G formula based on SCr of 0.93 mg/dL).    Microbiology Results Abnormal      None          Imaging Results (Last 24 Hours)     Procedure Component Value Units Date/Time    CT Chest Without Contrast [[id number]] Collected:  09/08/20 1810     Updated:  09/08/20 1819    Narrative:       EXAMINATION: CT CHEST WO CONTRAST-      INDICATION: generalized weakness and hypoxemia; E87.7-Wxyp-amqhpyuumf  and hyponatremia; R29.6-Repeated falls; R41.82-Altered mental status,  unspecified; R09.02-Hypoxemia; I10-Essential (primary) hypertension     TECHNIQUE: Spiral acquisition 5 mm axial images through the chest and  upper abdomen     The radiation dose reduction device was turned on for each scan per the  ALARA (As Low as Reasonably Achievable) protocol.     COMPARISON: No recent comparison exams 0407 2005 chest CT scan     FINDINGS: History indicates increasing dyspnea X2 days.     There appears to be some chronic left lung volume loss similar to the  prior study. There is mild left lower lobe bronchiectasis associated  with a small area of atelectasis along the left upper margin of the  patient's large hiatal hernia. Small focus of interstitial disease is  seen in the inferior tip of the lingula and may be either acute or  chronic but is minimal in extent. There is a new pleural-based nodule, 8  mm in diameter in the lingula. Left lung otherwise appears clear. Trace  linear scarring in the right lung apex is stable. Right lung otherwise  appears clear except for a calcified upper lobe granuloma. There is no  pleural effusion. Hiatal hernia is approximately 8.56 m in diameter.  Mediastinal window images show significant reflux into the upper  esophagus. No mediastinal mass adenopathy or pericardial effusion is  appreciated. There is extensive coronary artery calcification.     Included images of the upper abdomen show no significant abnormalities  of the visualized portions of the liver, spleen, pancreas, adrenal  glands, or upper renal poles. Gallbladder appears to surgically absent.  No upper  abdominal free air or ascites or adenopathy is seen. There are  lower thoracic and upper lumbar kyphoplasty is, and what appears to be a  potential acute fracture at what appears to be L3. Please refer to  sagittal image 71 series 900.             Impression:       1. Moderate to large hiatal hernia and mild associated atelectasis.  2. Trace interstitial disease in the lingula most likely some  generalized atelectasis. Inflammation, if present, is minimal.  3. Round and rather bland appearing pleural-based 8 mm nodule in the  lingula, but new since 2005. Consider follow-up through [facility]  pulmonary nodule clinic.  4. Incidentally noted previous thoracic and lumbar kyphoplasties and  what appears to be acute superior endplate compression deformity of L3.                     I have reviewed the medications:  Scheduled Meds:  amiodarone 200 mg Oral Daily   cefTRIAXone 1 g Intravenous Q24H   metoprolol tartrate 50 mg Oral BID   pramipexole 1 mg Oral BID   sodium chloride 10 mL Intravenous Q12H     Continuous Infusions:   PRN Meds:.docusate sodium  •  sodium chloride  •  sodium chloride    Assessment/Plan   Assessment & Plan     Active Hospital Problems    Diagnosis  POA   • **Acute hyponatremia [E87.1]  Yes   • HTN (hypertension) [I10]  Yes   • HLD (hyperlipidemia) [E78.5]  Unknown   • Depression [F32.9]  Unknown   • A-fib (CMS/HCC) [I48.91]  Unknown   • Fall [W19.XXXA]  Unknown   • Generalized weakness [R53.1]  Unknown   • Lung nodule [R91.1]  Unknown   • Hiatal hernia [K44.9]  Unknown   • Elevated LFTs [R79.89]  Unknown   • HALLIE (acute kidney injury) (CMS/HCC) [N17.9]  Yes   • Acute UTI (urinary tract infection) [N39.0]  Yes   • Hypoxia [R09.02]  Unknown      Resolved Hospital Problems   No resolved problems to display.        Brief Hospital Course to date:  Zhanna Mccabe is a 80 y.o. female with afib on chronic anticoagulation, HTN, HL, IBS, RLS, and nocturnal oxygen use, with recent T/L spine kyphoplasties  here with dysuria and AMS    Severe hyponatremia  --slowly improving  --cymbalta/triamterene held  --nephrology consulted  --on fluid restriction    Hypoxia  Lung nodue  --CT with pleural based nodule, outpatient pulmonary clinic follow up  --on BIPAP/oxygen at night    Falls/weakness/encephalopathy  --due to recent UTI and low Na  --improve sodium  --continue rocephin    Acute endplate L3 compression deformity  --NSG consulted  --PT/OT    UTI  --ceftriaxone    Elevated LFTs  --US pending    Afib  --xarelto held, on BB/amio    HTN  --hold maxzide    Depression  --hold cymbalta    HH  --outpatient follow up         DVT Prophylaxis:  S/P xarelto, pending evaluation from NSG      Disposition: I expect the patient to be discharged TBD, may need rehab.    CODE STATUS:   Code Status and Medical Interventions:   Ordered at: 09/08/20 2035     Level Of Support Discussed With:    Patient     Code Status:    CPR     Medical Interventions (Level of Support Prior to Arrest):    Full         Electronically signed by Jose Westbrook MD, 09/09/20, 07:47.

## 2020-09-09 NOTE — PLAN OF CARE
Problem: Patient Care Overview  Goal: Plan of Care Review  Flowsheets (Taken 9/9/2020 8537)  Outcome Summary: OT eval completed. Pt. presents with impaired activity tolerance, decreased balance, and a decline in ADL performance. Pt. limited by fear of falling and demonstrates posterior lean when seated EOB and coming to stand. Will self correct with cueing. Completed Bed mobility with Mod A and T/Fs with Min A. Demonstrated grooming and LBD with CGA. Skilled OT services warranted to promote return to PLOF. Recommend SNF Rehab at discharge.

## 2020-09-09 NOTE — THERAPY EVALUATION
Acute Care - Occupational Therapy Initial Evaluation  [facility]     Patient Name: Zhanna Mccabe  : 1940  MRN: [id number]  Today's Date: 2020             Admit Date: 2020       ICD-10-CM ICD-9-CM   1. Acute hyponatremia E87.1 276.1   2. Frequent falls R29.6 V15.88   3. Acute on chronic alteration in mental status R41.82 780.09   4. Hypoxemia R09.02 799.02   5. Elevated blood pressure reading with diagnosis of hypertension I10 401.9   6. Impaired mobility and ADLs Z74.09 V49.89    Z78.9      Patient Active Problem List   Diagnosis   • Acute hyponatremia   • HTN (hypertension)   • HLD (hyperlipidemia)   • Depression   • A-fib (CMS/HCC)   • Fall   • Generalized weakness   • Lung nodule   • Hiatal hernia   • Elevated LFTs   • HALLIE (acute kidney injury) (CMS/HCC)   • Acute UTI (urinary tract infection)   • Hypoxia     Past Medical History:   Diagnosis Date   • A-fib (CMS/HCC)    • Breast cancer (CMS/HCC)     left breast   • Depression    • High cholesterol    • Hypertension    • IBS (irritable bowel syndrome)    • Restless leg      Past Surgical History:   Procedure Laterality Date   • ARM SKIN LESION BIOPSY / EXCISION      melanoma   • BLADDER REPAIR      prolapse   • BREAST BIOPSY     • CHOLECYSTECTOMY     • HAND SURGERY Right     4th & 5th fingers   • HYSTERECTOMY     • HYSTERECTOMY     • KNEE SURGERY Left     ORIF   • MASTECTOMY Left    • MASTECTOMY            OT ASSESSMENT FLOWSHEET (last 12 hours)      Occupational Therapy Evaluation     Row Name 20 0828                   OT Evaluation Time/Intention    Document Type  evaluation  -LC        Mode of Treatment  occupational therapy  -LC        Patient Effort  good  -        Comment  (S) Pt. fearful on falling   -           General Information    Patient Profile Reviewed?  yes  -LC        Patient/Family Observations  No family present   -LC        General Observations of Patient  Pt. in bed on arrival   -LC        Prior Level  of Function  independent:;all household mobility;transfer;dressing;grooming;feeding;min assist:;bathing  -        Equipment Currently Used at Home  rollator;shower chair;grab bar;oxygen  -        Existing Precautions/Restrictions  fall;oxygen therapy device and L/min  -        Barriers to Rehab  previous functional deficit;ineffective coping  -           Home Main Entrance    Number of Stairs, Main Entrance  two  -           Cognitive Assessment/Interventions    Additional Documentation  Cognitive Assessment/Intervention (Group)  -           Cognitive Assessment/Intervention- PT/OT    Affect/Mental Status (Cognitive)  anxious  -        Behavioral Issues (Cognitive)  difficulty managing stress;overwhelmed easily  -        Orientation Status (Cognition)  oriented x 4  -        Follows Commands (Cognition)  WFL;over 90% accuracy;verbal cues/prompting required  -        Safety Deficit (Cognitive)  judgment;safety precautions awareness;safety precautions follow-through/compliance  -           Safety Issues, Functional Mobility    Safety Issues Affecting Function (Mobility)  awareness of need for assistance;judgment;safety precaution awareness;safety precautions follow-through/compliance;sequencing abilities  -        Impairments Affecting Function (Mobility)  balance;coordination;endurance/activity tolerance;postural/trunk control;strength  -           Bed Mobility Assessment/Treatment    Bed Mobility Assessment/Treatment  scooting/bridging;supine-sit  -        Scooting/Bridging [place] (Bed Mobility)  maximum assist (25% patient effort);verbal cues  -        Supine-Sit [place] (Bed Mobility)  moderate assist (50% patient effort);verbal cues;nonverbal cues (demo/gesture)  -        Assistive Device (Bed Mobility)  head of bed elevated;draw sheet;bed rails  -        Comment (Bed Mobility)  VC's to correct initial posterior lean when seated EOB secondary to fear of falling.   -            Transfer Assessment/Treatment    Transfer Assessment/Treatment  bed-chair transfer;sit-stand transfer;stand-sit transfer  -        Comment (Transfers)  VC's for hand placement and sequecing. VC's for upright posture to improve stability.   -           Bed-Chair Transfer    Bed-Chair [place] (Transfers)  minimum assist (75% patient effort);verbal cues  -        Assistive Device (Bed-Chair Transfers)  walker, front-wheeled  -           Sit-Stand Transfer    Sit-Stand [place] (Transfers)  minimum assist (75% patient effort);verbal cues  -        Assistive Device (Sit-Stand Transfers)  walker, front-wheeled  -           Stand-Sit Transfer    Stand-Sit [place] (Transfers)  minimum assist (75% patient effort);verbal cues  -        Assistive Device (Stand-Sit Transfers)  walker, front-wheeled  -           ADL Assessment/Intervention    BADL Assessment/Intervention  lower body dressing;grooming  -           Lower Body Dressing Assessment/Training    Lower Body Dressing [place] Level  don;doff;socks;contact guard assist  -        Lower Body Dressing Position  edge of bed sitting  -           Grooming Assessment/Training    [place] Level (Grooming)  wash face, hands;contact guard assist  -        Grooming Position  edge of bed sitting  -        Comment (Grooming)  CGA for safety with anxiety for fear of falling  -           BADL Safety/Performance    Impairments, BADL Safety/Performance  balance;endurance/activity tolerance;strength;trunk/postural control;coordination  -        Skilled BADL Treatment/Intervention  BADL process/adaptation training;cognitive/safety deficit modifications  -           General ROM    GENERAL ROM COMMENTS  BUE AROM grossly WFL  -           MMT (Manual Muscle Testing)    General MMT Comments  BUE grossly 4-/5   -           Motor Assessment/Interventions    Additional Documentation  Balance (Group)  -           Balance    Balance   static sitting balance;static standing balance;dynamic sitting balance;dynamic standing balance  -           Static Sitting Balance    Level of [place] (Unsupported Sitting, Static Balance)  standby assist  -        Sitting Position (Unsupported Sitting, Static Balance)  sitting on edge of bed  -        Time Able to Maintain Position (Unsupported Sitting, Static Balance)  more than 5 minutes  -           Dynamic Sitting Balance    Level of [place], Reaches Outside Midline (Sitting, Dynamic Balance)  contact guard assist  -        Sitting Position, Reaches Outside Midline (Sitting, Dynamic Balance)  sitting on edge of bed  -        Comment, Reaches Outside Midline (Sitting, Dynamic Balance)  Donning/[place] socks   -           Static Standing Balance    Level of [place] (Supported Standing, Static Balance)  contact guard assist  -        Time Able to Maintain Position (Supported Standing, Static Balance)  1 to 2 minutes  -        Assistive Device Utilized (Supported Standing, Static Balance)  walker, rolling  -           Dynamic Standing Balance    Level of [place], Reaches Outside Midline (Standing, Dynamic Balance)  minimal assist, 75% patient effort  -        Time Able to Maintain Position, Reaches Outside Midline (Standing, Dynamic Balance)  30 to 45 seconds  -        Assistive Device Utilized (Supported Standing, Dynamic Balance)  walker, rolling  -LC           Sensory Assessment/Intervention    Sensory General Assessment  no sensation deficits identified  -           Positioning and Restraints    Pre-Treatment Position  in bed  -        Post Treatment Position  chair  -        In Chair  notified nsg;reclined;call light within reach;encouraged to call for assist;exit alarm on;[place] cushion;legs elevated  -           Pain Assessment    Additional Documentation  Pain Scale: Numbers Pre/Post-Treatment (Group)  -           Pain Scale: Numbers Pre/Post-Treatment     Pain Scale: Numbers, Pretreatment  0/10 - no pain  -LC        Pain Scale: Numbers, Post-Treatment  0/10 - no pain  -LC           Plan of Care Review    Plan of Care Reviewed With  patient  -LC           Clinical Impression (OT)    OT Diagnosis  impaired ADLs   -LC        Therapy Frequency (OT Eval)  daily  -LC        Care Plan Review (OT)  evaluation/treatment results reviewed;care plan/treatment goals reviewed;risks/benefits reviewed;current/potential barriers reviewed;patient/other agree to care plan  -LC        Anticipated Discharge Disposition (OT)  skilled nursing facility Rehab   -LC           Vital Signs    Pre Systolic BP Rehab  -- VSS, Cleared with RN for Tx   -LC        Pre Patient Position  Supine  -LC        Intra Patient Position  Standing  -LC        Post Patient Position  Sitting  -LC           OT Goals    Transfer Goal Selection (OT)  transfer, OT goal 1  -LC        Dressing Goal Selection (OT)  dressing, OT goal 1  -LC        Toileting Goal Selection (OT)  --  -LC        Activity Tolerance Goal Selection (OT)  activity tolerance, OT goal 1  -LC        Additional Documentation  Activity Tolerance Goal Selection (OT) (Row)  -LC           Transfer Goal 1 (OT)    Activity/Assistive Device (Transfer Goal 1, OT)  sit-to-stand/stand-to-sit;walker, rolling  -LC        [place] Level/Cues Needed (Transfer Goal 1, OT)  standby assist  -LC        Time Frame (Transfer Goal 1, OT)  10 days;long term goal (LTG)  -LC        Progress/Outcome (Transfer Goal 1, OT)  goal ongoing  -LC           Dressing Goal 1 (OT)    Activity/Assistive Device (Dressing Goal 1, OT)  lower body dressing  -LC        [place]/Cues Needed (Dressing Goal 1, OT)  standby assist;verbal cues required  -LC        Time Frame (Dressing Goal 1, OT)  long term goal (LTG);10 days  -LC        Progress/Outcome (Dressing Goal 1, OT)  goal ongoing  -LC            Activity Tolerance Goal 1 (OT)    Activity Tolerance Goal 1 (OT)  Pt. will  participate in ADL task while standing by discharge.   -        Activity Level (Endurance Goal 1, OT)  5 min activity  -        Time Frame (Activity Tolerance Goal 1, OT)  long term goal (LTG);10 days  -        Progress/Outcome (Activity Tolerance Goal 1, OT)  goal ongoing  -           Living Environment    Home Accessibility  stairs to enter home  -          User Key  (r) = Recorded By, (t) = Taken By, (c) = Cosigned By    Initials Name Effective Dates     Shelli Arzate OT 07/18/19 -          Occupational Therapy Education                 Title: PT OT SLP Therapies (In Progress)     Topic: Occupational Therapy (In Progress)     Point: ADL training (In Progress)     Description:   Instruct learner(s) on proper safety adaptation and remediation techniques during self care or transfers.   Instruct in proper use of assistive devices.              Learning Progress Summary           Patient Acceptance, E, NR,NL by  at 9/9/2020 0828                   Point: Home exercise program (Not Started)     Description:   Instruct learner(s) on appropriate technique for monitoring, assisting and/or progressing therapeutic exercises/activities.              Learner Progress:   Not documented in this visit.          Point: Precautions (In Progress)     Description:   Instruct learner(s) on prescribed precautions during self-care and functional transfers.              Learning Progress Summary           Patient Acceptance, E, NR,NL by  at 9/9/2020 0828                   Point: Body mechanics (In Progress)     Description:   Instruct learner(s) on proper positioning and spine alignment during self-care, functional mobility activities and/or exercises.              Learning Progress Summary           Patient Acceptance, E, NR,NL by  at 9/9/2020 0828                               User Key     Initials Effective Dates Name Provider Type Discipline     07/18/19 -  Shelli Arzate OT Occupational Therapist OT                   OT Recommendation and Plan  Outcome Summary/Treatment Plan (OT)  Anticipated Discharge Disposition (OT): skilled nursing facility(Rehab )  Therapy Frequency (OT Eval): daily  Plan of Care Review  Plan of Care Reviewed With: patient  Plan of Care Reviewed With: patient  Outcome Summary: OT eval completed. Pt. presents with impaired activity tolerance, decreased balance, and a decline in ADL performance. Pt. limited by fear of falling and demonstrates posterior lean when seated EOB and coming to stand. Will self correct with cueing. Completed Bed mobility with Mod A and T/Fs with Min A. Demonstrated grooming and LBD with CGA. Skilled OT services warranted to promote return to PLOF. Recommend SNF Rehab at discharge.    Outcome Measures     Row Name 09/09/20 0828             How much help from another is currently needed...    Putting on and taking off regular lower body clothing?  3  -LC      Bathing (including washing, rinsing, and drying)  2  -LC      Toileting (which includes using toilet bed pan or urinal)  2  -LC      Putting on and taking off regular upper body clothing  3  -LC      Taking care of personal grooming (such as brushing teeth)  3  -LC      Eating meals  4  -LC      AM-PAC 6 Clicks Score (OT)  17  -         Functional Assessment    Outcome Measure Options  AM-PAC 6 Clicks Daily Activity (OT)  -        User Key  (r) = Recorded By, (t) = Taken By, (c) = Cosigned By    Initials Name Provider Type    Shelli Calixto OT Occupational Therapist          Time Calculation:   Time Calculation- OT     Row Name 09/09/20 0828             Time Calculation- OT    OT Start Time  0828  -      OT Received On  09/09/20  -      OT Goal Re-Cert Due Date  09/19/20  -        User Key  (r) = Recorded By, (t) = Taken By, (c) = Cosigned By    Initials Name Provider Type    Shelli Calixto OT Occupational Therapist        Therapy Charges for Today     Code Description Service Date Service Provider  Modifiers Qty    [id number] HC OT EVAL MOD COMPLEXITY 4 9/9/2020 Shelli Arzate, OT GO 1               Shelli Arzate OT  9/9/2020

## 2020-09-09 NOTE — PLAN OF CARE
Problem: Fall Risk (Adult)  Goal: Identify Related Risk Factors and Signs and Symptoms  Outcome: Outcome(s) achieved  Flowsheets (Taken 9/8/2020 2218)  Related Risk Factors (Fall Risk): age-related changes; gait/mobility problems; history of falls  Signs and Symptoms (Fall Risk): presence of risk factors  Goal: Absence of Fall  Outcome: Ongoing (interventions implemented as appropriate)  Flowsheets (Taken 9/8/2020 2218)  Absence of Fall: making progress toward outcome     Problem: Pain, Chronic (Adult)  Goal: Identify Related Risk Factors and Signs and Symptoms  Outcome: Outcome(s) achieved  Flowsheets (Taken 9/8/2020 2218)  Related Risk Factors (Chronic Pain): pain control inadequate  Signs and Symptoms (Chronic Pain): verbalization of pain/discomfort for a prolonged time period  Goal: Acceptable Pain/Comfort Level and Functional Ability  Outcome: Ongoing (interventions implemented as appropriate)  Flowsheets (Taken 9/8/2020 2218)  Acceptable Pain/Comfort Level and Functional Ability: making progress toward outcome     Problem: Patient Care Overview  Goal: Plan of Care Review  Outcome: Ongoing (interventions implemented as appropriate)  Flowsheets (Taken 9/8/2020 2218)  Progress: no change  Plan of Care Reviewed With: patient  Outcome Summary: Pt admitted to unit.

## 2020-09-09 NOTE — PLAN OF CARE
Problem: Patient Care Overview  Goal: Plan of Care Review  Outcome: Ongoing (interventions implemented as appropriate)  Flowsheets (Taken 9/9/2020 2278)  Plan of Care Reviewed With: patient  Outcome Summary: PT initial evaluation completed for pt presenting with generalized weakness, increased confusion, and decreased functional mobility. Pt performed STS transfer and ambulated 8ft with RW and [place]anni. Pt limited by unsteady gait with increased fear of falling. Pt's decreased independence warrants PT skilled care. Recommend D/C to SNF.

## 2020-09-09 NOTE — CONSULTS
Referring Provider: Dulce Benitez   Reason for Consultation: Hyponatremia     Subjective     Chief complaint back pain, dysuria     History of present illness:  This is 80 year old female with past medical hx of Afib, HTn, HLD, IBS, RLS, breast cancer and depression presented to ED for low back pain, increased urinary frequency and confusion. She was started on Trimaterene/HCTZ and oxybutynin last week and Cefuroxime was started on last Sunday for UTI. She has had 4 fall in last week. She is also drinking lots of water. She has been on cymbalta for depression. Sodium at presentation was 116. Nephrology service has been consulted for Hyponatremia management.     History  Past Medical History:   Diagnosis Date   • A-fib (CMS/HCC)    • Breast cancer (CMS/HCC) 1989    left breast   • Depression    • High cholesterol    • Hypertension    • IBS (irritable bowel syndrome)    • Restless leg    ,   Past Surgical History:   Procedure Laterality Date   • ARM SKIN LESION BIOPSY / EXCISION      melanoma   • BLADDER REPAIR      prolapse   • BREAST BIOPSY     • CHOLECYSTECTOMY     • HAND SURGERY Right     4th & 5th fingers   • HYSTERECTOMY  1983   • HYSTERECTOMY     • KNEE SURGERY Left     ORIF   • MASTECTOMY Left 1989   • MASTECTOMY     ,   Family History   Problem Relation Age of Onset   • Breast cancer Maternal Aunt 68   • Breast cancer Maternal Aunt 68   • Cancer Mother    • Hypertension Mother    • Cancer Father    • BRCA 1/2 Neg Hx    ,   Social History     Tobacco Use   • Smoking status: Never Smoker   • Smokeless tobacco: Never Used   Substance Use Topics   • Alcohol use: No     Frequency: Never   • Drug use: No   ,   Medications Prior to Admission   Medication Sig Dispense Refill Last Dose   • amiodarone (PACERONE) 200 MG tablet Take 200 mg by mouth Daily.      • cefuroxime (CEFTIN) 250 MG tablet Take 250 mg by mouth 2 (Two) Times a Day.      • docusate sodium (COLACE) 100 MG capsule Take 100 mg by mouth 2 (Two)  Times a Day As Needed.  1 Taking   • DULoxetine (CYMBALTA) 30 MG capsule Take 30 mg by mouth Daily.      • HYDROcodone-acetaminophen (NORCO) 7.5-325 MG per tablet Take 1 tablet by mouth Every 6 (Six) Hours As Needed for Moderate Pain .      • metoprolol tartrate (LOPRESSOR) 25 MG tablet Take 50 mg by mouth 2 (Two) Times a Day.  6 Taking   • oxybutynin XL (DITROPAN-XL) 10 MG 24 hr tablet Take 10 mg by mouth Daily.      • pramipexole (MIRAPEX) 0.5 MG tablet Take 1 mg by mouth 2 (two) times a day.  2 Taking   • triamterene-hydrochlorothiazide (MAXZIDE) 75-50 MG per tablet Take 1 tablet by mouth Daily.      • XARELTO 20 MG tablet Take 20 mg by mouth Daily With Dinner.  5 Taking   , Scheduled Meds:    amiodarone 200 mg Oral Daily   cefTRIAXone 1 g Intravenous Q24H   metoprolol tartrate 50 mg Oral BID   pramipexole 1 mg Oral BID   sodium chloride 10 mL Intravenous Q12H   , Continuous Infusions:   , PRN Meds:  docusate sodium  •  HYDROcodone-acetaminophen  •  LORazepam  •  sodium chloride  •  sodium chloride and Allergies:  Abilify [aripiprazole]; Ambien [zolpidem]; and Lyrica [pregabalin]    Review of Systems  Pertinent items are noted in HPI    Objective     Vital Signs  Temp:  [97.1 °F (36.2 °C)-98.5 °F (36.9 °C)] 98.5 °F (36.9 °C)  Heart Rate:  [52-67] 67  Resp:  [18] 18  BP: (112-155)/(48-66) 138/48    No intake/output data recorded.  I/O last 3 completed shifts:  In: 340 [P.O.:240; IV Piggyback:100]  Out: 550 [Urine:550]    Physical Exam:     General Appearance:    confused, cooperative, in no acute distress   Head:    Normocephalic, without obvious abnormality, atraumatic   Eyes:            Conjunctivae and sclerae normal, no   icterus, EOMI   Ears:    Ears appear intact with no abnormalities noted   Throat:   No oral lesions, no thrush, oral mucosa moist   Neck:   Supple, trachea midline, no thyromegaly,  no JVD       Lungs:     Clear to auscultation,respirations regular, even and                   unlabored     Heart:    Regular rhythm and normal rate, normal S1 and S2, no            murmur, no gallop, no rub, no click       Abdomen:     Normal bowel sounds, no masses, no organomegaly, soft        non-tender, non-distended, no guarding, no rebound                 tenderness       Extremities:   Moves all extremities well, no edema, no cyanosis, no              redness   Pulses:   Pulses palpable and equal bilaterally   Skin:   No bleeding, bruising or rash       Neurologic:   Cranial nerves 2 - 12 grossly intact, no focal deficit noted       Results Review:   I reviewed the patient's new clinical results.    WBC WBC   Date Value Ref Range Status   09/09/2020 6.44 3.40 - 10.80 10*3/mm3 Final   09/08/2020 8.59 3.40 - 10.80 10*3/mm3 Final      HGB Hemoglobin   Date Value Ref Range Status   09/09/2020 10.5 (L) 12.0 - 15.9 g/dL Final   09/08/2020 12.0 12.0 - 15.9 g/dL Final      HCT Hematocrit   Date Value Ref Range Status   09/09/2020 33.6 (L) 34.0 - 46.6 % Final   09/08/2020 37.4 34.0 - 46.6 % Final      Platlets No results found for: LABPLAT   MCV MCV   Date Value Ref Range Status   09/09/2020 80.8 79.0 - 97.0 fL Final   09/08/2020 81.0 79.0 - 97.0 fL Final          Sodium Sodium   Date Value Ref Range Status   09/09/2020 122 (L) 136 - 145 mmol/L Final   09/09/2020 120 (L) 136 - 145 mmol/L Final   09/09/2020 119 (C) 136 - 145 mmol/L Final   09/08/2020 116 (C) 136 - 145 mmol/L Final      Potassium Potassium   Date Value Ref Range Status   09/09/2020 4.2 3.5 - 5.2 mmol/L Final   09/08/2020 4.6 3.5 - 5.2 mmol/L Final      Chloride Chloride   Date Value Ref Range Status   09/09/2020 86 (L) 98 - 107 mmol/L Final   09/08/2020 81 (L) 98 - 107 mmol/L Final      CO2 CO2   Date Value Ref Range Status   09/09/2020 24.0 22.0 - 29.0 mmol/L Final   09/08/2020 23.0 22.0 - 29.0 mmol/L Final      BUN BUN   Date Value Ref Range Status   09/09/2020 27 (H) 8 - 23 mg/dL Final   09/08/2020 28 (H) 8 - 23 mg/dL Final      Creatinine Creatinine    Date Value Ref Range Status   09/09/2020 0.93 0.57 - 1.00 mg/dL Final   09/08/2020 1.12 (H) 0.57 - 1.00 mg/dL Final      Calcium Calcium   Date Value Ref Range Status   09/09/2020 8.9 8.6 - 10.5 mg/dL Final   09/08/2020 9.8 8.6 - 10.5 mg/dL Final      PO4 No results found for: CAPO4   Albumin Albumin   Date Value Ref Range Status   09/09/2020 3.80 3.50 - 5.20 g/dL Final   09/08/2020 4.40 3.50 - 5.20 g/dL Final      Magnesium No results found for: MG   Uric Acid No results found for: URICACID           amiodarone 200 mg Oral Daily   cefTRIAXone 1 g Intravenous Q24H   metoprolol tartrate 50 mg Oral BID   pramipexole 1 mg Oral BID   sodium chloride 10 mL Intravenous Q12H          Assessment/Plan       Acute hyponatremia    HTN (hypertension)    HLD (hyperlipidemia)    Depression    A-fib (CMS/HCC)    Fall    Generalized weakness    Lung nodule    Hiatal hernia    Elevated LFTs    HALLIE (acute kidney injury) (CMS/HCC)    Acute UTI (urinary tract infection)    Hypoxia      1- Hyponatremia - Symptomatic with frequent fall and confusion - [facility] - Urine sodium 471, urine sodium 72 and serum osmolality 260. Patient recently started on Triamterene/HCTZ while on Cymbalta. Recently drinking lots of fluids.     Plan:  - Fluid restriction   - serial sodium monitoring.   - Continue to hold Triamterene/HCTZ and Cymbalta.   - Goal is to correct sodium level 6 mEq in 24 hrs. Will monitor with serial sodium level - if rapidly correcting will need DDAVP and /or D5W infusion.     I discussed the patients findings and my recommendations with patient and nursing staff    David Thomas MD  09/09/20

## 2020-09-09 NOTE — CONSULTS
NEUROSURGERY CONSULTATION    Referring Provider: No ref. provider found    Patient Care Team:  Mackenzie Whitten MD as PCP - General (Internal Medicine)    Chief Complaint: Back pain    History of Present Illness: Ms. Mccabe is an 80-year-old woman with a PMH significant for A. fib, hypertension, breast cancer, nocturnal hypoxia, recent compression fracture (status post L1 and L4 kyphoplasty several weeks ago by Dr. Terrazas) who presented to [facility] ED with complaints of low back pain and dysuria.  Patient states that she had a fall around 1 week ago but cannot recall the exact mechanism.  She also reports pain distal to the knees bilaterally but states this is chronic.  Patient's daughter reported that patient was experiencing intermittent confusion.  Symptoms progressed and ultimately she presented to our facility for further evaluation.  CT chest upon arrival noted an incidental finding of a superior endplate deformity of L3 to which neurosurgical consultation has been requested.      Review of Systems:  Musculoskeletal and Neurological systems were reviewed and are negative except for:  Musculoskeletal: positive for See HPI  Neurological: positive for See HPI    History:  Past Medical History:   Diagnosis Date   • A-fib (CMS/HCC)    • Breast cancer (CMS/HCC) 1989    left breast   • Depression    • High cholesterol    • Hypertension    • IBS (irritable bowel syndrome)    • Restless leg    ,   Past Surgical History:   Procedure Laterality Date   • ARM SKIN LESION BIOPSY / EXCISION      melanoma   • BLADDER REPAIR      prolapse   • BREAST BIOPSY     • CHOLECYSTECTOMY     • HAND SURGERY Right     4th & 5th fingers   • HYSTERECTOMY  1983   • HYSTERECTOMY     • KNEE SURGERY Left     ORIF   • MASTECTOMY Left 1989   • MASTECTOMY     ,   Family History   Problem Relation Age of Onset   • Breast cancer Maternal Aunt 68   • Breast cancer Maternal Aunt 68   • Cancer Mother    • Hypertension Mother    • Cancer Father    •  "BRCA 1/2 Neg Hx    ,   Social History     Tobacco Use   • Smoking status: Never Smoker   • Smokeless tobacco: Never Used   Substance Use Topics   • Alcohol use: No     Frequency: Never   • Drug use: No   ,   Medications Prior to Admission   Medication Sig Dispense Refill Last Dose   • amiodarone (PACERONE) 200 MG tablet Take 200 mg by mouth Daily.      • cefuroxime (CEFTIN) 250 MG tablet Take 250 mg by mouth 2 (Two) Times a Day.      • docusate sodium (COLACE) 100 MG capsule Take 100 mg by mouth 2 (Two) Times a Day As Needed.  1 Taking   • DULoxetine (CYMBALTA) 30 MG capsule Take 30 mg by mouth Daily.      • HYDROcodone-acetaminophen (NORCO) 7.5-325 MG per tablet Take 1 tablet by mouth Every 6 (Six) Hours As Needed for Moderate Pain .      • metoprolol tartrate (LOPRESSOR) 25 MG tablet Take 50 mg by mouth 2 (Two) Times a Day.  6 Taking   • oxybutynin XL (DITROPAN-XL) 10 MG 24 hr tablet Take 10 mg by mouth Daily.      • pramipexole (MIRAPEX) 0.5 MG tablet Take 1 mg by mouth 2 (two) times a day.  2 Taking   • triamterene-hydrochlorothiazide (MAXZIDE) 75-50 MG per tablet Take 1 tablet by mouth Daily.      • XARELTO 20 MG tablet Take 20 mg by mouth Daily With Dinner.  5 Taking    and Allergies:  Abilify [aripiprazole]; Ambien [zolpidem]; and Lyrica [pregabalin]      Physical Exam:  Vital Signs: Blood pressure 138/48, pulse 67, temperature 98.5 °F (36.9 °C), temperature source Oral, resp. rate 18, height 165.1 cm (65\"), weight 69.5 kg (153 lb 3.2 oz), SpO2 91 %, not currently breastfeeding.  Physical Exam   Constitutional: She is oriented to person, place, and time. She appears well-developed and well-nourished. No distress.   HENT:   Head: Normocephalic and atraumatic.   Neck: Normal range of motion. Neck supple.   Musculoskeletal:   Tenderness with palpation of lumbar and upper thoracic regions   Neurological: She is alert and oriented to person, place, and time. She has normal strength. No sensory deficit.   Skin: " Skin is warm and dry. She is not diaphoretic.   Psychiatric: She has a normal mood and affect.       Data Review:  CT chest without contrast demonstrates incidental finding of superior endplate fracture of L3.  XR lumbar spine performed 7/28/2020 demonstrates kyphoplasty changes at the L1 and L4 levels with mild loss of height at T12.  There was no L3 fracture at this time.    Diagnosis:  1.  Back pain  2.  History of compression fractures    Treatment Recommendations:  This is an 80-year-old woman who is admitted to our facility for back pain, generalized weakness and an acute UTI.  She has a history of recent compression deformity status post kyphoplasty at L1 and L4 several weeks ago at an outside facility.  I have ordered thoracic and lumbar MRIs to further assess the degree of deformity of the L3 vertebral body as well as rule out other areas of compression deformities.  Further recommendations pending these studies.      Fabiana Sullivan PA-C  09/09/20  11:25

## 2020-09-10 LAB
ANION GAP SERPL CALCULATED.3IONS-SCNC: 13 MMOL/L (ref 5–15)
BUN SERPL-MCNC: 23 MG/DL (ref 8–23)
BUN/CREAT SERPL: 24 (ref 7–25)
CALCIUM SPEC-SCNC: 9.5 MG/DL (ref 8.6–10.5)
CHLORIDE SERPL-SCNC: 91 MMOL/L (ref 98–107)
CO2 SERPL-SCNC: 23 MMOL/L (ref 22–29)
CREAT SERPL-MCNC: 0.96 MG/DL (ref 0.57–1)
DEPRECATED RDW RBC AUTO: 45.5 FL (ref 37–54)
ERYTHROCYTE [DISTWIDTH] IN BLOOD BY AUTOMATED COUNT: 15.7 % (ref 12.3–15.4)
GFR SERPL CREATININE-BSD FRML MDRD: 56 ML/MIN/1.73
GLUCOSE SERPL-MCNC: 139 MG/DL (ref 65–99)
HCT VFR BLD AUTO: 37.2 % (ref 34–46.6)
HGB BLD-MCNC: 11.9 G/DL (ref 12–15.9)
MCH RBC QN AUTO: 26.2 PG (ref 26.6–33)
MCHC RBC AUTO-ENTMCNC: 32 G/DL (ref 31.5–35.7)
MCV RBC AUTO: 81.9 FL (ref 79–97)
PLATELET # BLD AUTO: 289 10*3/MM3 (ref 140–450)
PMV BLD AUTO: 9.8 FL (ref 6–12)
POTASSIUM SERPL-SCNC: 3.8 MMOL/L (ref 3.5–5.2)
RBC # BLD AUTO: 4.54 10*6/MM3 (ref 3.77–5.28)
SODIUM SERPL-SCNC: 124 MMOL/L (ref 136–145)
SODIUM SERPL-SCNC: 127 MMOL/L (ref 136–145)
SODIUM SERPL-SCNC: 127 MMOL/L (ref 136–145)
SODIUM SERPL-SCNC: 128 MMOL/L (ref 136–145)
SODIUM SERPL-SCNC: 130 MMOL/L (ref 136–145)
WBC # BLD AUTO: 9.41 10*3/MM3 (ref 3.4–10.8)

## 2020-09-10 PROCEDURE — 99233 SBSQ HOSP IP/OBS HIGH 50: CPT | Performed by: HOSPITALIST

## 2020-09-10 PROCEDURE — 84295 ASSAY OF SERUM SODIUM: CPT | Performed by: INTERNAL MEDICINE

## 2020-09-10 PROCEDURE — 25010000002 HEPARIN (PORCINE) PER 1000 UNITS: Performed by: HOSPITALIST

## 2020-09-10 PROCEDURE — 25010000002 CEFTRIAXONE PER 250 MG: Performed by: NURSE PRACTITIONER

## 2020-09-10 PROCEDURE — 80048 BASIC METABOLIC PNL TOTAL CA: CPT | Performed by: HOSPITALIST

## 2020-09-10 PROCEDURE — 85027 COMPLETE CBC AUTOMATED: CPT | Performed by: HOSPITALIST

## 2020-09-10 RX ORDER — HALOPERIDOL 5 MG/ML
1 INJECTION INTRAMUSCULAR EVERY 6 HOURS PRN
Status: DISCONTINUED | OUTPATIENT
Start: 2020-09-10 | End: 2020-09-15 | Stop reason: HOSPADM

## 2020-09-10 RX ORDER — QUETIAPINE FUMARATE 25 MG/1
25 TABLET, FILM COATED ORAL NIGHTLY
Status: DISCONTINUED | OUTPATIENT
Start: 2020-09-10 | End: 2020-09-10

## 2020-09-10 RX ORDER — HEPARIN SODIUM 5000 [USP'U]/ML
5000 INJECTION, SOLUTION INTRAVENOUS; SUBCUTANEOUS EVERY 8 HOURS SCHEDULED
Status: DISCONTINUED | OUTPATIENT
Start: 2020-09-10 | End: 2020-09-14

## 2020-09-10 RX ORDER — QUETIAPINE FUMARATE 25 MG/1
12.5 TABLET, FILM COATED ORAL ONCE
Status: COMPLETED | OUTPATIENT
Start: 2020-09-10 | End: 2020-09-10

## 2020-09-10 RX ADMIN — HEPARIN SODIUM 5000 UNITS: 5000 INJECTION INTRAVENOUS; SUBCUTANEOUS at 08:21

## 2020-09-10 RX ADMIN — METOPROLOL TARTRATE 50 MG: 50 TABLET, FILM COATED ORAL at 20:31

## 2020-09-10 RX ADMIN — SODIUM CHLORIDE, PRESERVATIVE FREE 10 ML: 5 INJECTION INTRAVENOUS at 08:22

## 2020-09-10 RX ADMIN — PRAMIPEXOLE DIHYDROCHLORIDE 1 MG: 1 TABLET ORAL at 08:21

## 2020-09-10 RX ADMIN — AMIODARONE HYDROCHLORIDE 200 MG: 200 TABLET ORAL at 08:21

## 2020-09-10 RX ADMIN — HYDROCODONE BITARTRATE AND ACETAMINOPHEN 1 TABLET: 7.5; 325 TABLET ORAL at 17:09

## 2020-09-10 RX ADMIN — HEPARIN SODIUM 5000 UNITS: 5000 INJECTION INTRAVENOUS; SUBCUTANEOUS at 21:41

## 2020-09-10 RX ADMIN — SODIUM CHLORIDE, PRESERVATIVE FREE 10 ML: 5 INJECTION INTRAVENOUS at 20:33

## 2020-09-10 RX ADMIN — QUETIAPINE FUMARATE 12.5 MG: 25 TABLET ORAL at 08:21

## 2020-09-10 RX ADMIN — METOPROLOL TARTRATE 50 MG: 50 TABLET, FILM COATED ORAL at 08:21

## 2020-09-10 RX ADMIN — HEPARIN SODIUM 5000 UNITS: 5000 INJECTION INTRAVENOUS; SUBCUTANEOUS at 15:15

## 2020-09-10 RX ADMIN — PRAMIPEXOLE DIHYDROCHLORIDE 1 MG: 1 TABLET ORAL at 20:31

## 2020-09-10 RX ADMIN — CEFTRIAXONE SODIUM 1 G: 1 INJECTION, POWDER, FOR SOLUTION INTRAMUSCULAR; INTRAVENOUS at 21:40

## 2020-09-10 NOTE — PLAN OF CARE
Problem: Patient Care Overview  Goal: Plan of Care Review  Outcome: Ongoing (interventions implemented as appropriate)  Flowsheets (Taken 9/10/2020 0324)  Progress: no change  Plan of Care Reviewed With: patient  Outcome Summary: Pt remains confused, irritable, aggitated, and restless; staff attempted position changes, heat/cold, essential oils, toileting offered and medications offered and given- all with little effect. VSS and currently on room air. MRI attempted without success, Hositalist PA notified. Will continue to monitor.  Problem: Fall Risk (Adult)  Goal: Absence of Fall  Outcome: Ongoing (interventions implemented as appropriate)  Flowsheets (Taken 9/10/2020 0324)  Absence of Fall: achieves outcome     Problem: Pain, Chronic (Adult)  Goal: Acceptable Pain/Comfort Level and Functional Ability  Outcome: Ongoing (interventions implemented as appropriate)  Flowsheets (Taken 9/10/2020 0324)  Acceptable Pain/Comfort Level and Functional Ability: making progress toward outcome     Problem: Confusion, Acute (Adult)  Goal: Identify Related Risk Factors and Signs and Symptoms  Outcome: Outcome(s) achieved  Flowsheets (Taken 9/10/2020 0324)  Related Risk Factors (Acute Confusion): advanced age;mobility decreased;pain  Signs and Symptoms (Acute Confusion): disorientation  Goal: Cognitive/Functional Impairments Minimized  Outcome: Ongoing (interventions implemented as appropriate)  Flowsheets (Taken 9/10/2020 0324)  Cognitive/Functional Impairments Minimized: making progress toward outcome  Goal: Safety  Outcome: Ongoing (interventions implemented as appropriate)  Flowsheets (Taken 9/10/2020 0324)  Safety: making progress toward outcome

## 2020-09-10 NOTE — PROGRESS NOTES
Continued Stay Note  [facility]     Patient Name: Zhanna Mccabe  MRN: [id number]  Today's Date: 9/10/2020    Admit Date: 9/8/2020    Discharge Plan     Row Name 09/10/20 1543       Plan    Plan Comments  QUENTIN spoke with pt's daughter as PT recommended SNF. Pt's daughter reports she is agreeable and thinks that may be best for pt to go to rehab. Pt has been to the [facility] and citation. Pt's daughter reports that she would like pt to go to the [facility]. QUENTIN called Amie with the [facility] for referral.        Discharge Codes    No documentation.             AUDREY Bond

## 2020-09-10 NOTE — PLAN OF CARE
Patient was on room air this shift. VSS. Started the shift with confusion, agitation, and noncompliance but after a three hour nap, voiding after ambulating to the bedside commode, patient became less confused and oriented to self, place, and situation(not to time). Daughter visited at bedside and patient waxed and waned. Patient got a full bed bath and linen/gown change. Fall precautions are in place, will continue to monitor and reassess.

## 2020-09-10 NOTE — PROGRESS NOTES
FOLLOW UP ENCOUNTER          CHIEF COMPLAINT::   Incidental moderate compression fracture L3                        MEDICAL HISTORY SINCE LAST ENCOUNTER :       Was unable to obtain MRIs because of agitation and confusion.  In my opinion the compression fracture of L3 is an incidental finding and of minimal clinical significance.  She has significant health issues otherwise that need to be resolved prior to any consideration of the possibility of kyphoplasty should that be warranted.    Do not think that MRI is essential till all else resolved AND she has intractable back pain                                    ASSESSMENT/DISPOSITION :       1.  We will discontinue daily visits all of her be more than happy to see her prior to discharge.  She is in no neurosurgical urgency.  Her pain seems to be minimal and not the issue at this time.

## 2020-09-10 NOTE — PROGRESS NOTES
[facility] Medicine Services  PROGRESS NOTE    Patient Name: Zhanna Mccabe  : 1940  MRN: [id number]    Date of Admission: 2020  Primary Care Physician: Mackenzie Whitten MD    Subjective   Subjective     CC:  Weakness/falls    HPI:  Confused this morning. Didn't sleep last night. Agitated and confused yesterday per nursing. She is oriented to person only. She notes back and leg pain. Denies f/c. No dyspnea.      Review of Systems   Constitutional: Positive for activity change, appetite change and fatigue.   HENT: Negative.    Respiratory: Negative.    Cardiovascular: Negative.    Genitourinary: Negative for frequency.   Musculoskeletal: Positive for back pain and gait problem.   Neurological: Positive for weakness. Negative for dizziness.        Falls   Psychiatric/Behavioral: Positive for confusion, decreased concentration and sleep disturbance.          Objective   Objective     Vital Signs:   Temp:  [97.9 °F (36.6 °C)-98.5 °F (36.9 °C)] 98.2 °F (36.8 °C)  Heart Rate:  [] 69  Resp:  [16-18] 18  BP: (114-167)/(40-88) 167/70        Physical Exam:  NAD, alert and oriented x 1  OP clear, dry MM  PERRL  Neck supple  No LAD  RRR  Decreased at bases, poor effort, no tachypnea and non-labored however, and on RA  +BS, ND, NT, soft, stable  RUIZ, stable, no pain with straight leg raise either  NO rashes  Anxious/confused    Results Reviewed:  Results from last 7 days   Lab Units 09/10/20  0611 20  1438   WBC 10*3/mm3 9.41 6.44 8.59   HEMOGLOBIN g/dL 11.9* 10.5* 12.0   HEMATOCRIT % 37.2 33.6* 37.4   PLATELETS 10*3/mm3 289 218 250   PROCALCITONIN ng/mL  --   --  0.17     Results from last 7 days   Lab Units 09/10/20  0611 09/10/20  0016 20  1438   SODIUM mmol/L 127* 124* 122*   < > 120*   < > 116*   POTASSIUM mmol/L 3.8  --   --   --  4.2  --  4.6   CHLORIDE mmol/L 91*  --   --   --  86*  --  81*   CO2 mmol/L 23.0  --   --    --  24.0  --  23.0   BUN mg/dL 23  --   --   --  27*  --  28*   CREATININE mg/dL 0.96  --   --   --  0.93  --  1.12*   GLUCOSE mg/dL 139*  --   --   --  104*  --  113*   CALCIUM mg/dL 9.5  --   --   --  8.9  --  9.8   ALT (SGPT) U/L  --   --   --   --  111*  --  114*   AST (SGOT) U/L  --   --   --   --  107*  --  121*   TROPONIN T ng/mL  --   --   --   --   --   --  <0.010   PROBNP pg/mL  --   --   --   --   --   --  1,159.0    < > = values in this interval not displayed.     Estimated Creatinine Clearance: 44.2 mL/min (by C-G formula based on SCr of 0.96 mg/dL).    Microbiology Results Abnormal     Procedure Component Value - Date/Time    Urine Culture - Urine, Urine, Catheter [[id number]]  (Normal) Collected:  09/08/20 1650    Lab Status:  Final result Specimen:  Urine, Catheter Updated:  09/09/20 2207     Urine Culture No growth    COVID-19,[facility] [facility], NP SWAB IN [facility] VIRAL TRANSPORT MEDIA 24-30 HR TAT - Swab, Nasopharynx [[id number]] Collected:  09/08/20 1818    Lab Status:  Final result Specimen:  Swab from Nasopharynx Updated:  09/09/20 1501     SARS-CoV-2 ALAN Not Detected          Imaging Results (Last 24 Hours)     Procedure Component Value Units Date/Time    CT Chest Without Contrast [[id number]] Collected:  09/08/20 1810     Updated:  09/09/20 2146    Narrative:       EXAMINATION: CT CHEST WO CONTRAST-      INDICATION: Generalized weakness; E87.7-Kbdn-mhtcpumjry and  hyponatremia; R29.6-Repeated falls; R41.82-Altered mental status,  unspecified; R09.02-Hypoxemia; I10-Essential (primary) hypertension.     TECHNIQUE: Spiral acquisition 5 mm axial images through the chest and  upper abdomen.     The radiation dose reduction device was turned on for each scan per the  ALARA (As Low as Reasonably Achievable) protocol.     COMPARISON: No recent comparison exams. 04/07/2005 chest CT scan.     FINDINGS: History indicates increasing dyspnea X2 days.     There appears to be some chronic left lung volume loss similar  to the  prior study. There is mild left lower lobe bronchiectasis associated  with a small area of atelectasis along the left upper margin of the  patient's large hiatal hernia. Small focus of interstitial disease is  seen in the inferior tip of the lingula and may be either acute or  chronic but is minimal in extent. There is a new pleural-based nodule, 8  mm in diameter in the lingula. Left lung otherwise appears clear. Trace  linear scarring in the right lung apex is stable. Right lung otherwise  appears clear except for a calcified upper lobe granuloma. There is no  pleural effusion. Hiatal hernia is approximately 8.5 cm in diameter.  Mediastinal window images show significant reflux into the upper  esophagus. No mediastinal mass, adenopathy or pericardial effusion is  appreciated. There is extensive coronary artery calcification.     Included images of the upper abdomen show no significant abnormalities  of the visualized portions of the liver, spleen, pancreas, adrenal  glands, or upper renal poles. Gallbladder appears to be surgically  absent. No upper abdominal free air, ascites or adenopathy is seen.  There are lower thoracic and upper lumbar kyphoplasties, and what  appears to be a potential acute fracture at what appears to be L3.  Please refer to sagittal image 71 series 900.       Impression:       1. Moderate to large hiatal hernia and mild associated atelectasis.  2. Trace interstitial disease in the lingula most likely some  generalized atelectasis. Inflammation, if present, is minimal.  3. Round and rather bland appearing pleural-based 8 mm nodule in the  lingula, but new since 2005. Consider followup through [facility]  pulmonary nodule clinic.  4. Incidentally noted previous thoracic and lumbar kyphoplasties and  what appears to be acute superior endplate compression deformity of L3.     D:  09/08/2020  E:  09/09/2020     This report was finalized on 9/9/2020 9:43 PM by Dr. Jose Ann MD.        US Liver [[id number]] Collected:  09/09/20 0857     Updated:  09/09/20 1659    Narrative:       EXAMINATION: US LIVER-09/09/2020:     INDICATION: Elevated LFTs; E87.4-Joso-ptjduncqtm and hyponatremia;  R29.6-Repeated falls; R41.82-Altered mental status, unspecified;  R09.02-Hypoxemia; I10-Essential (primary) hypertension, abnormal liver  enzymes.     TECHNIQUE: Sonographic imaging was obtained of the right upper quadrant  in both the sagittal and transverse planes.     COMPARISON: NONE.     FINDINGS: The pancreas is homogeneous in its visualized portions. The  liver is homogeneous and normal in echotexture and echogenicity with no  focal mass or intrahepatic biliary ductal dilatation. The gallbladder  fossa is unremarkable.  The common bile duct is mildly dilated at 1.5  cm. The right kidney is normal in size, configuration, and texture  measuring in length from pole to pole 9.9 cm. No solid cortical mass or  renal cortical cysts seen within the right kidney. No hydronephrosis or  nephrolithiasis.       Impression:       Mild dilatation of the common bile duct at 1.5 cm. The  remainder of the right upper quadrant ultrasound is unremarkable.     D:  09/09/2020  E:  09/09/2020            This report was finalized on 9/9/2020 4:56 PM by Dr. Yu Lopez MD.                  I have reviewed the medications:  Scheduled Meds:    amiodarone 200 mg Oral Daily   cefTRIAXone 1 g Intravenous Q24H   heparin (porcine) 5,000 Units Subcutaneous Q8H   metoprolol tartrate 50 mg Oral BID   pramipexole 1 mg Oral BID   QUEtiapine 12.5 mg Oral Once   QUEtiapine 25 mg Oral Nightly   sodium chloride 10 mL Intravenous Q12H     Continuous Infusions:   PRN Meds:.docusate sodium  •  haloperidol lactate  •  haloperidol lactate  •  HYDROcodone-acetaminophen  •  LORazepam  •  sodium chloride  •  sodium chloride    Assessment/Plan   Assessment & Plan     Active Hospital Problems    Diagnosis  POA   • **Acute hyponatremia [E87.1]  Yes   •  HTN (hypertension) [I10]  Yes   • HLD (hyperlipidemia) [E78.5]  Unknown   • Depression [F32.9]  Unknown   • A-fib (CMS/HCC) [I48.91]  Unknown   • Fall [W19.XXXA]  Unknown   • Generalized weakness [R53.1]  Unknown   • Lung nodule [R91.1]  Unknown   • Hiatal hernia [K44.9]  Unknown   • Elevated LFTs [R79.89]  Unknown   • HALLIE (acute kidney injury) (CMS/HCC) [N17.9]  Yes   • Acute UTI (urinary tract infection) [N39.0]  Yes   • Hypoxia [R09.02]  Unknown      Resolved Hospital Problems   No resolved problems to display.        Brief Hospital Course to date:  Zhanna Mccabe is a 80 y.o. female with afib on chronic anticoagulation, HTN, HL, IBS, RLS, and nocturnal oxygen use, with recent T/L spine kyphoplasties here with dysuria and AMS    Severe hyponatremia  --slowly improving, appropriately  --cymbalta/triamterene held  --nephrology consulted, and following  --on fluid restriction    Encephalopathy  --hospital delirium, coupled with hyponatremia and sleep deprivation  --recent UTI  --correct sodium  --will add nightly seroquel  --mobilize during day with PT/up in chair    Hypoxia  Lung nodue  --CT with pleural based nodule, outpatient pulmonary clinic follow up  --on BIPAP/oxygen at night  --on RA this AM    Acute endplate L3 compression deformity  --NSG consulted, likely not acute, pain control, mobilize  --PT/OT    UTI  --ceftriaxone    Elevated LFTs  --US abnormal, mild elevation in alk phos/angel  --MRCP ordered    Afib  --xarelto held, on BB/amio  --rate stable, resume xarelto pending MRCP results, need for procedure determined    HTN  --hold maxzide    Depression  --hold cymbalta    HH  --outpatient follow up     Attempted to call familyJia at 1033 AM, no answer.      DVT Prophylaxis:  KE, likely to resume xarelto soon      Disposition: I expect the patient to be discharged TBD, may need rehab.    CODE STATUS:   Code Status and Medical Interventions:   Ordered at: 09/08/20 2035     Level Of Support Discussed  With:    Patient     Code Status:    CPR     Medical Interventions (Level of Support Prior to Arrest):    Full         Electronically signed by Jose Westbrook MD, 09/10/20, 07:33.

## 2020-09-10 NOTE — PROGRESS NOTES
"   LOS: 2 days    Patient Care Team:  Mackenzie Whitten MD as PCP - General (Internal Medicine)    Chief Complaint:  Fall and confusion.     Subjective     Interval History:     No acute events overnight. Confused. Agitated overnight.     Review of Systems:   Unable to obtain secondary to patient factor. Confused.     Objective     Vital Sign Min/Max for last 24 hours  Temp  Min: 97.9 °F (36.6 °C)  Max: 98.5 °F (36.9 °C)   BP  Min: 114/40  Max: 167/70   Pulse  Min: 66  Max: 101   Resp  Min: 16  Max: 18   SpO2  Min: 91 %  Max: 94 %   No data recorded   Weight  Min: 69.2 kg (152 lb 9.6 oz)  Max: 69.2 kg (152 lb 9.6 oz)     Flowsheet Rows      First Filed Value   Admission Height  165.1 cm (65\") Documented at 09/08/2020 1417   Admission Weight  70.3 kg (155 lb) Documented at 09/08/2020 1417          I/O this shift:  In: 100 [P.O.:100]  Out: -   I/O last 3 completed shifts:  In: 620 [P.O.:420; IV Piggyback:200]  Out: 1950 [Urine:1950]    Physical Exam:     General Appearance:   Confused, in no acute distress   Head:    Normocephalic, atraumatic   Eyes:            Conjunctivae and sclerae normal,EOMI   Ears:    Ears appear intact with no abnormalities noted   Throat:   No oral lesions, no thrush, oral mucosa moist   Neck:   Supple,  no JVD       Lungs:     Clear to auscultation,respirations regular, even and                   unlabored    Heart:    Regular rhythm and normal rate, normal S1 and S2       Abdomen:     Normal bowel sounds, no masses, no organomegaly, soft        non-tender, non-distended       Extremities:   Moves all extremities well, no edema, no cyanosis, no              redness   Pulses:   Pulses palpable and equal bilaterally           Neurologic:   Confused. No focal deficit noted grossly.        WBC WBC   Date Value Ref Range Status   09/10/2020 9.41 3.40 - 10.80 10*3/mm3 Final   09/09/2020 6.44 3.40 - 10.80 10*3/mm3 Final   09/08/2020 8.59 3.40 - 10.80 10*3/mm3 Final      HGB Hemoglobin   Date Value " Ref Range Status   09/10/2020 11.9 (L) 12.0 - 15.9 g/dL Final   09/09/2020 10.5 (L) 12.0 - 15.9 g/dL Final   09/08/2020 12.0 12.0 - 15.9 g/dL Final      HCT Hematocrit   Date Value Ref Range Status   09/10/2020 37.2 34.0 - 46.6 % Final   09/09/2020 33.6 (L) 34.0 - 46.6 % Final   09/08/2020 37.4 34.0 - 46.6 % Final      Platlets No results found for: LABPLAT   MCV MCV   Date Value Ref Range Status   09/10/2020 81.9 79.0 - 97.0 fL Final   09/09/2020 80.8 79.0 - 97.0 fL Final   09/08/2020 81.0 79.0 - 97.0 fL Final          Sodium Sodium   Date Value Ref Range Status   09/10/2020 127 (L) 136 - 145 mmol/L Final   09/10/2020 124 (L) 136 - 145 mmol/L Final   09/09/2020 122 (L) 136 - 145 mmol/L Final   09/09/2020 122 (L) 136 - 145 mmol/L Final   09/09/2020 122 (L) 136 - 145 mmol/L Final   09/09/2020 120 (L) 136 - 145 mmol/L Final   09/09/2020 119 (C) 136 - 145 mmol/L Final   09/08/2020 116 (C) 136 - 145 mmol/L Final      Potassium Potassium   Date Value Ref Range Status   09/10/2020 3.8 3.5 - 5.2 mmol/L Final   09/09/2020 4.2 3.5 - 5.2 mmol/L Final   09/08/2020 4.6 3.5 - 5.2 mmol/L Final      Chloride Chloride   Date Value Ref Range Status   09/10/2020 91 (L) 98 - 107 mmol/L Final   09/09/2020 86 (L) 98 - 107 mmol/L Final   09/08/2020 81 (L) 98 - 107 mmol/L Final      CO2 CO2   Date Value Ref Range Status   09/10/2020 23.0 22.0 - 29.0 mmol/L Final   09/09/2020 24.0 22.0 - 29.0 mmol/L Final   09/08/2020 23.0 22.0 - 29.0 mmol/L Final      BUN BUN   Date Value Ref Range Status   09/10/2020 23 8 - 23 mg/dL Final   09/09/2020 27 (H) 8 - 23 mg/dL Final   09/08/2020 28 (H) 8 - 23 mg/dL Final      Creatinine Creatinine   Date Value Ref Range Status   09/10/2020 0.96 0.57 - 1.00 mg/dL Final   09/09/2020 0.93 0.57 - 1.00 mg/dL Final   09/08/2020 1.12 (H) 0.57 - 1.00 mg/dL Final      Calcium Calcium   Date Value Ref Range Status   09/10/2020 9.5 8.6 - 10.5 mg/dL Final   09/09/2020 8.9 8.6 - 10.5 mg/dL Final   09/08/2020 9.8 8.6 - 10.5  mg/dL Final      PO4 No results found for: CAPO4   Albumin Albumin   Date Value Ref Range Status   09/09/2020 3.80 3.50 - 5.20 g/dL Final   09/08/2020 4.40 3.50 - 5.20 g/dL Final      Magnesium No results found for: MG   Uric Acid No results found for: URICACID        Results Review:     I reviewed the patient's new clinical results.      amiodarone 200 mg Oral Daily   cefTRIAXone 1 g Intravenous Q24H   heparin (porcine) 5,000 Units Subcutaneous Q8H   metoprolol tartrate 50 mg Oral BID   pramipexole 1 mg Oral BID   QUEtiapine 25 mg Oral Nightly   sodium chloride 10 mL Intravenous Q12H          Medication Review:     Assessment/Plan       Acute hyponatremia    HTN (hypertension)    HLD (hyperlipidemia)    Depression    A-fib (CMS/HCC)    Fall    Generalized weakness    Lung nodule    Hiatal hernia    Elevated LFTs    HALLIE (acute kidney injury) (CMS/HCC)    Acute UTI (urinary tract infection)    Hypoxia      1- Hyponatremia - Symptomatic with frequent fall and confusion - Euvolumic - Urine sodium 471, urine sodium 72 and serum osmolality 260. Patient recently started on Triamterene/HCTZ while on Cymbalta. Recently drinking lots of fluids.      Plan:  - Fluid restriction - correcting within goal.   - serial sodium monitoring.   - Continue to hold Triamterene/HCTZ and Cymbalta. Avoid Seroquel - Can cause SIADH  - Goal is to correct sodium level 6 mEq in 24 hrs. Will monitor with serial sodium level - if rapidly correcting will need DDAVP and /or D5W infusion.        David Thomas MD  09/10/20  09:07

## 2020-09-11 ENCOUNTER — APPOINTMENT (OUTPATIENT)
Dept: MRI IMAGING | Facility: HOSPITAL | Age: 80
End: 2020-09-11

## 2020-09-11 LAB
SODIUM SERPL-SCNC: 127 MMOL/L (ref 136–145)
SODIUM SERPL-SCNC: 129 MMOL/L (ref 136–145)
SODIUM SERPL-SCNC: 129 MMOL/L (ref 136–145)
SODIUM SERPL-SCNC: 131 MMOL/L (ref 136–145)

## 2020-09-11 PROCEDURE — 84295 ASSAY OF SERUM SODIUM: CPT | Performed by: INTERNAL MEDICINE

## 2020-09-11 PROCEDURE — 72146 MRI CHEST SPINE W/O DYE: CPT

## 2020-09-11 PROCEDURE — 25010000002 HEPARIN (PORCINE) PER 1000 UNITS: Performed by: HOSPITALIST

## 2020-09-11 PROCEDURE — 25010000002 CEFTRIAXONE PER 250 MG: Performed by: NURSE PRACTITIONER

## 2020-09-11 PROCEDURE — 74181 MRI ABDOMEN W/O CONTRAST: CPT

## 2020-09-11 PROCEDURE — 99233 SBSQ HOSP IP/OBS HIGH 50: CPT | Performed by: HOSPITALIST

## 2020-09-11 PROCEDURE — 72148 MRI LUMBAR SPINE W/O DYE: CPT

## 2020-09-11 RX ORDER — HALOPERIDOL 1 MG/1
2 TABLET ORAL NIGHTLY PRN
Status: DISCONTINUED | OUTPATIENT
Start: 2020-09-11 | End: 2020-09-15 | Stop reason: HOSPADM

## 2020-09-11 RX ADMIN — PRAMIPEXOLE DIHYDROCHLORIDE 1 MG: 1 TABLET ORAL at 18:21

## 2020-09-11 RX ADMIN — METOPROLOL TARTRATE 50 MG: 50 TABLET, FILM COATED ORAL at 11:00

## 2020-09-11 RX ADMIN — HYDROCODONE BITARTRATE AND ACETAMINOPHEN 1 TABLET: 7.5; 325 TABLET ORAL at 11:57

## 2020-09-11 RX ADMIN — HEPARIN SODIUM 5000 UNITS: 5000 INJECTION INTRAVENOUS; SUBCUTANEOUS at 05:04

## 2020-09-11 RX ADMIN — CEFTRIAXONE SODIUM 1 G: 1 INJECTION, POWDER, FOR SOLUTION INTRAMUSCULAR; INTRAVENOUS at 20:47

## 2020-09-11 RX ADMIN — PRAMIPEXOLE DIHYDROCHLORIDE 1 MG: 1 TABLET ORAL at 20:46

## 2020-09-11 RX ADMIN — AMIODARONE HYDROCHLORIDE 200 MG: 200 TABLET ORAL at 11:00

## 2020-09-11 RX ADMIN — PRAMIPEXOLE DIHYDROCHLORIDE 1 MG: 1 TABLET ORAL at 11:00

## 2020-09-11 RX ADMIN — SODIUM CHLORIDE, PRESERVATIVE FREE 10 ML: 5 INJECTION INTRAVENOUS at 20:58

## 2020-09-11 RX ADMIN — SODIUM CHLORIDE, PRESERVATIVE FREE 10 ML: 5 INJECTION INTRAVENOUS at 11:02

## 2020-09-11 RX ADMIN — HEPARIN SODIUM 5000 UNITS: 5000 INJECTION INTRAVENOUS; SUBCUTANEOUS at 20:47

## 2020-09-11 RX ADMIN — HEPARIN SODIUM 5000 UNITS: 5000 INJECTION INTRAVENOUS; SUBCUTANEOUS at 14:57

## 2020-09-11 NOTE — PLAN OF CARE
Problem: Fall Risk (Adult)  Goal: Absence of Fall  Outcome: Ongoing (interventions implemented as appropriate)   Patient sodium 131. VSS. Room air. Scattered bruises(Coccyx/arms) from multiple falls have started to subside.  Patient has been more alert and oriented this shift. Tried to discontinue purewick but patient request to keep on stating she feels weak. Will continue to monitor and reassess.   Problem: Fall Risk (Adult)  Goal: Absence of Fall  Outcome: Ongoing (interventions implemented as appropriate)     Problem: Pain, Chronic (Adult)  Goal: Acceptable Pain/Comfort Level and Functional Ability  Outcome: Ongoing (interventions implemented as appropriate)     Problem: Patient Care Overview  Goal: Plan of Care Review  Outcome: Ongoing (interventions implemented as appropriate)  Goal: Individualization and Mutuality  Outcome: Ongoing (interventions implemented as appropriate)  Goal: Discharge Needs Assessment  Outcome: Ongoing (interventions implemented as appropriate)  Goal: Interprofessional Rounds/Family Conf  Outcome: Ongoing (interventions implemented as appropriate)     Problem: Confusion, Acute (Adult)  Goal: Cognitive/Functional Impairments Minimized  Outcome: Ongoing (interventions implemented as appropriate)  Goal: Safety  Outcome: Ongoing (interventions implemented as appropriate)

## 2020-09-11 NOTE — PROGRESS NOTES
[facility] Medicine Services  PROGRESS NOTE    Patient Name: Zhanna Mccabe  : 1940  MRN: [id number]    Date of Admission: 2020  Primary Care Physician: Mackenzie Whitten MD    Subjective   Subjective     CC:  Weakness/falls    HPI:  Better and more calm this morning, but had restlessness/confusion yesterday. Notes dry cough, no SOA. No f/c. No n/v. No abdominal pain.      Review of Systems   Constitutional: Positive for activity change, appetite change and fatigue.   HENT: Negative.    Respiratory: Positive for cough. Negative for chest tightness and shortness of breath.    Cardiovascular: Negative.  Negative for chest pain.   Gastrointestinal: Negative for abdominal distention and abdominal pain.   Genitourinary: Negative for frequency.   Musculoskeletal: Positive for back pain. Negative for gait problem.   Neurological: Positive for weakness. Negative for dizziness.        Falls   Psychiatric/Behavioral: Positive for agitation, confusion, decreased concentration and sleep disturbance.          Objective   Objective     Vital Signs:   Temp:  [97.9 °F (36.6 °C)-98.3 °F (36.8 °C)] 98.2 °F (36.8 °C)  Heart Rate:  [67-72] 69  Resp:  [18] 18  BP: (114-147)/(49-79) 118/54        Physical Exam:  NAD, alert and oriented x 2  OP clear, MMM  PERRL  Neck supple  No LAD  RRR  Decreased at bases, poor effort, non-labored and no tachypnea  +BS, ND, NT, soft  No c/c/e  No rashes  Flat  RUIZ, with generalized weakness    Results Reviewed:  Results from last 7 days   Lab Units 09/10/20  0611 20  0412 20  1438   WBC 10*3/mm3 9.41 6.44 8.59   HEMOGLOBIN g/dL 11.9* 10.5* 12.0   HEMATOCRIT % 37.2 33.6* 37.4   PLATELETS 10*3/mm3 289 218 250   PROCALCITONIN ng/mL  --   --  0.17     Results from last 7 days   Lab Units 20  0536 20  0036 09/10/20  1811  09/10/20  0611  20  0412  20  1438   SODIUM mmol/L 127* 129* 130*   < > 127*   < > 120*   < > 116*   POTASSIUM  mmol/L  --   --   --   --  3.8  --  4.2  --  4.6   CHLORIDE mmol/L  --   --   --   --  91*  --  86*  --  81*   CO2 mmol/L  --   --   --   --  23.0  --  24.0  --  23.0   BUN mg/dL  --   --   --   --  23  --  27*  --  28*   CREATININE mg/dL  --   --   --   --  0.96  --  0.93  --  1.12*   GLUCOSE mg/dL  --   --   --   --  139*  --  104*  --  113*   CALCIUM mg/dL  --   --   --   --  9.5  --  8.9  --  9.8   ALT (SGPT) U/L  --   --   --   --   --   --  111*  --  114*   AST (SGOT) U/L  --   --   --   --   --   --  107*  --  121*   TROPONIN T ng/mL  --   --   --   --   --   --   --   --  <0.010   PROBNP pg/mL  --   --   --   --   --   --   --   --  1,159.0    < > = values in this interval not displayed.     Estimated Creatinine Clearance: 44.2 mL/min (by C-G formula based on SCr of 0.96 mg/dL).    Microbiology Results Abnormal     Procedure Component Value - Date/Time    Urine Culture - Urine, Urine, Catheter [[id number]]  (Normal) Collected:  09/08/20 1650    Lab Status:  Final result Specimen:  Urine, Catheter Updated:  09/09/20 2207     Urine Culture No growth    COVID-19,[facility], NP SWAB IN [facility] VIRAL TRANSPORT MEDIA 24-30 HR TAT - Swab, Nasopharynx [[id number]] Collected:  09/08/20 1818    Lab Status:  Final result Specimen:  Swab from Nasopharynx Updated:  09/09/20 1501     SARS-CoV-2 ALAN Not Detected          Imaging Results (Last 24 Hours)     ** No results found for the last 24 hours. **               I have reviewed the medications:  Scheduled Meds:    amiodarone 200 mg Oral Daily   cefTRIAXone 1 g Intravenous Q24H   heparin (porcine) 5,000 Units Subcutaneous Q8H   metoprolol tartrate 50 mg Oral BID   pramipexole 1 mg Oral BID   sodium chloride 10 mL Intravenous Q12H     Continuous Infusions:   PRN Meds:.docusate sodium  •  haloperidol  •  haloperidol lactate  •  HYDROcodone-acetaminophen  •  LORazepam  •  sodium chloride  •  sodium chloride    Assessment/Plan   Assessment & Plan     Active Hospital Problems     Diagnosis  POA   • **Acute hyponatremia [E87.1]  Yes   • HTN (hypertension) [I10]  Yes   • HLD (hyperlipidemia) [E78.5]  Unknown   • Depression [F32.9]  Unknown   • A-fib (CMS/HCC) [I48.91]  Unknown   • Fall [W19.XXXA]  Unknown   • Generalized weakness [R53.1]  Unknown   • Lung nodule [R91.1]  Unknown   • Hiatal hernia [K44.9]  Unknown   • Elevated LFTs [R79.89]  Unknown   • HALLIE (acute kidney injury) (CMS/HCC) [N17.9]  Yes   • Acute UTI (urinary tract infection) [N39.0]  Yes   • Hypoxia [R09.02]  Unknown      Resolved Hospital Problems   No resolved problems to display.        Brief Hospital Course to date:  Zhanna Mccabe is a 80 y.o. female with afib on chronic anticoagulation, HTN, HL, IBS, RLS, and nocturnal oxygen use, with recent T/L spine kyphoplasties here with dysuria and AMS    Severe hyponatremia  --slowly improving, appropriately  --cymbalta/triamterene held  --nephrology consulted, and following  --on fluid restriction    Encephalopathy  --hospital delirium, coupled with hyponatremia and sleep deprivation  --recent UTI  --correct sodium  --slept better last night  --mobilize/PT/OT  --seemingly better this morning, so far    Hypoxia  Lung nodue  --CT with pleural based nodule, outpatient pulmonary clinic follow up  --on BIPAP/oxygen at night  --monitor    Acute endplate L3 compression deformity  --NSG consulted, likely not acute, pain control, mobilize  --MRI ordered per NSG however, pending  --PT/OT    UTI  --ceftriaxone, culture negative to date, no symptoms, no fevers, DC soon    Elevated LFTs  --US abnormal, mild elevation in alk phos/angel  --MRCP ordered, hopefully done today    Afib  --xarelto held, on BB/amio  --rate stable, resume xarelto pending MRCP results, need for procedure determined  --awaiting MRI for abnormal US and spinal imaging    HTN  --hold maxzide    Depression  --hold cymbalta    HH  --outpatient follow up         DVT Prophylaxis:  KE, likely to resume xarelto  soon      Disposition: I expect the patient to be discharged TBD, may need rehab.    CODE STATUS:   Code Status and Medical Interventions:   Ordered at: 09/08/20 2035     Level Of Support Discussed With:    Patient     Code Status:    CPR     Medical Interventions (Level of Support Prior to Arrest):    Full         Electronically signed by Jose Westbrook MD, 09/11/20, 07:28.

## 2020-09-11 NOTE — PROGRESS NOTES
"   LOS: 3 days    Patient Care Team:  Mackenzie Whitten MD as PCP - General (Internal Medicine)    Chief Complaint:  Fall and confusion.     Subjective     Interval History:     No acute events overnight. Na 131 from 129 Improving steadily total correction in last 24hr 7 points    Review of Systems:   Unable to obtain secondary to patient factor. Confused.     Objective     Vital Sign Min/Max for last 24 hours  Temp  Min: 97.9 °F (36.6 °C)  Max: 98.5 °F (36.9 °C)   BP  Min: 109/67  Max: 132/58   Pulse  Min: 58  Max: 71   Resp  Min: 17  Max: 18   SpO2  Min: 94 %  Max: 100 %   No data recorded   No data recorded     Flowsheet Rows      First Filed Value   Admission Height  165.1 cm (65\") Documented at 09/08/2020 1417   Admission Weight  70.3 kg (155 lb) Documented at 09/08/2020 1417          I/O this shift:  In: 240 [P.O.:240]  Out: -   I/O last 3 completed shifts:  In: 460 [P.O.:360; IV Piggyback:100]  Out: 750 [Urine:750]    Physical Exam:     General Appearance:   Confused, in no acute distress   Head:    Normocephalic, atraumatic   Eyes:            Conjunctivae and sclerae normal,EOMI   Ears:    Ears appear intact with no abnormalities noted   Throat:   No oral lesions, no thrush, oral mucosa moist   Neck:   Supple,  no JVD       Lungs:     Clear to auscultation,respirations regular, even and                   unlabored    Heart:    Regular rhythm and normal rate, normal S1 and S2       Abdomen:     Normal bowel sounds, no masses, no organomegaly, soft        non-tender, non-distended       Extremities:   Moves all extremities well, no edema, no cyanosis, no              redness   Pulses:   Pulses palpable and equal bilaterally           Neurologic:   Confused. No focal deficit noted grossly.        WBC WBC   Date Value Ref Range Status   09/10/2020 9.41 3.40 - 10.80 10*3/mm3 Final   09/09/2020 6.44 3.40 - 10.80 10*3/mm3 Final      HGB Hemoglobin   Date Value Ref Range Status   09/10/2020 11.9 (L) 12.0 - 15.9 g/dL " Final   09/09/2020 10.5 (L) 12.0 - 15.9 g/dL Final      HCT Hematocrit   Date Value Ref Range Status   09/10/2020 37.2 34.0 - 46.6 % Final   09/09/2020 33.6 (L) 34.0 - 46.6 % Final      Platlets No results found for: LABPLAT   MCV MCV   Date Value Ref Range Status   09/10/2020 81.9 79.0 - 97.0 fL Final   09/09/2020 80.8 79.0 - 97.0 fL Final          Sodium Sodium   Date Value Ref Range Status   09/11/2020 131 (L) 136 - 145 mmol/L Final   09/11/2020 127 (L) 136 - 145 mmol/L Final   09/11/2020 129 (L) 136 - 145 mmol/L Final   09/10/2020 130 (L) 136 - 145 mmol/L Final   09/10/2020 128 (L) 136 - 145 mmol/L Final   09/10/2020 127 (L) 136 - 145 mmol/L Final   09/10/2020 127 (L) 136 - 145 mmol/L Final   09/10/2020 124 (L) 136 - 145 mmol/L Final   09/09/2020 122 (L) 136 - 145 mmol/L Final   09/09/2020 122 (L) 136 - 145 mmol/L Final   09/09/2020 122 (L) 136 - 145 mmol/L Final   09/09/2020 120 (L) 136 - 145 mmol/L Final   09/09/2020 119 (C) 136 - 145 mmol/L Final      Potassium Potassium   Date Value Ref Range Status   09/10/2020 3.8 3.5 - 5.2 mmol/L Final   09/09/2020 4.2 3.5 - 5.2 mmol/L Final      Chloride Chloride   Date Value Ref Range Status   09/10/2020 91 (L) 98 - 107 mmol/L Final   09/09/2020 86 (L) 98 - 107 mmol/L Final      CO2 CO2   Date Value Ref Range Status   09/10/2020 23.0 22.0 - 29.0 mmol/L Final   09/09/2020 24.0 22.0 - 29.0 mmol/L Final      BUN BUN   Date Value Ref Range Status   09/10/2020 23 8 - 23 mg/dL Final   09/09/2020 27 (H) 8 - 23 mg/dL Final      Creatinine Creatinine   Date Value Ref Range Status   09/10/2020 0.96 0.57 - 1.00 mg/dL Final   09/09/2020 0.93 0.57 - 1.00 mg/dL Final      Calcium Calcium   Date Value Ref Range Status   09/10/2020 9.5 8.6 - 10.5 mg/dL Final   09/09/2020 8.9 8.6 - 10.5 mg/dL Final      PO4 No results found for: CAPO4   Albumin Albumin   Date Value Ref Range Status   09/09/2020 3.80 3.50 - 5.20 g/dL Final      Magnesium No results found for: MG   Uric Acid No results  found for: URICACID        Results Review:     I reviewed the patient's new clinical results.      amiodarone 200 mg Oral Daily   cefTRIAXone 1 g Intravenous Q24H   heparin (porcine) 5,000 Units Subcutaneous Q8H   metoprolol tartrate 50 mg Oral BID   pramipexole 1 mg Oral BID   sodium chloride 10 mL Intravenous Q12H          Medication Review:     Assessment/Plan       Acute hyponatremia    HTN (hypertension)    HLD (hyperlipidemia)    Depression    A-fib (CMS/HCC)    Fall    Generalized weakness    Lung nodule    Hiatal hernia    Elevated LFTs    HALLIE (acute kidney injury) (CMS/HCC)    Acute UTI (urinary tract infection)    Hypoxia      1- Hyponatremia - Symptomatic with frequent fall and confusion - Euvolumic - Urine sodium 471, urine sodium 72 and serum osmolality 260. Patient recently started on Triamterene/HCTZ while on Cymbalta. Recently drinking lots of fluids.      Plan:   - Fluid restriction - correcting within goal.   - serial sodium monitoring. Q8-12 hr  - Continue to hold Triamterene/HCTZ and Cymbalta. Avoid Seroquel - Can cause SIADH  - Goal is to correct sodium level 6 mEq in 24 hrs. Will monitor with serial sodium level - if rapidly correcting will need DDAVP and /or D5W infusion.        Bear Morales MD  09/11/20  15:06

## 2020-09-11 NOTE — PLAN OF CARE
Problem: Fall Risk (Adult)  Goal: Absence of Fall  Outcome: Ongoing (interventions implemented as appropriate)  Flowsheets (Taken 9/11/2020 0332)  Absence of Fall: making progress toward outcome     Problem: Pain, Chronic (Adult)  Goal: Acceptable Pain/Comfort Level and Functional Ability  Outcome: Ongoing (interventions implemented as appropriate)  Flowsheets (Taken 9/11/2020 0332)  Acceptable Pain/Comfort Level and Functional Ability: making progress toward outcome     Problem: Patient Care Overview  Goal: Plan of Care Review  Outcome: Ongoing (interventions implemented as appropriate)  Flowsheets  Taken 9/11/2020 0332  Progress: no change  Outcome Summary: Patient has had a decent shift, sleeping for most of the evening. VSS, and patient has been alert and oriented to person and place only. No complaints of pain tonight. Nursing staff will continue to monitor and assess the patient.  Taken 9/10/2020 2000  Plan of Care Reviewed With: patient  Goal: Individualization and Mutuality  Outcome: Ongoing (interventions implemented as appropriate)  Flowsheets (Taken 9/11/2020 0332)  Patient Specific Goals (Include Timeframe): Monitor and assess for pain q2hrs and prn     Problem: Confusion, Acute (Adult)  Goal: Cognitive/Functional Impairments Minimized  Outcome: Ongoing (interventions implemented as appropriate)  Flowsheets (Taken 9/11/2020 0332)  Cognitive/Functional Impairments Minimized: making progress toward outcome  Goal: Safety  Outcome: Ongoing (interventions implemented as appropriate)  Flowsheets (Taken 9/11/2020 0332)  Safety: making progress toward outcome

## 2020-09-11 NOTE — PROGRESS NOTES
Continued Stay Note  [facility]     Patient Name: Zhanna Mccabe  MRN: [id number]  Today's Date: 9/11/2020    Admit Date: 9/8/2020    Discharge Plan     Row Name 09/11/20 1246       Plan    Plan Comments  QUENTIN followed up with Amie with the [facility]. [facility] following. Pt will require another covid screening within 72 hours of discharge to the [facility]. Case management is following for discharge needs and will follow [facility] with [facility] and [facility] on Monday.     Final Discharge Disposition Code  03 - skilled nursing facility (SNF)    Row Name 09/11/20 1243       Plan    Plan Comments  QUENTIN followed up with Amie with [facility]. [facility] is following pat        Discharge Codes    No documentation.             AUDREY Bond

## 2020-09-12 LAB
SODIUM SERPL-SCNC: 131 MMOL/L (ref 136–145)
SODIUM SERPL-SCNC: 133 MMOL/L (ref 136–145)
SODIUM SERPL-SCNC: 135 MMOL/L (ref 136–145)

## 2020-09-12 PROCEDURE — 25010000002 HEPARIN (PORCINE) PER 1000 UNITS: Performed by: HOSPITALIST

## 2020-09-12 PROCEDURE — 97530 THERAPEUTIC ACTIVITIES: CPT | Performed by: OCCUPATIONAL THERAPIST

## 2020-09-12 PROCEDURE — 99232 SBSQ HOSP IP/OBS MODERATE 35: CPT | Performed by: INTERNAL MEDICINE

## 2020-09-12 PROCEDURE — 25010000002 LORAZEPAM PER 2 MG: Performed by: HOSPITALIST

## 2020-09-12 PROCEDURE — 97535 SELF CARE MNGMENT TRAINING: CPT | Performed by: OCCUPATIONAL THERAPIST

## 2020-09-12 PROCEDURE — 84295 ASSAY OF SERUM SODIUM: CPT | Performed by: INTERNAL MEDICINE

## 2020-09-12 PROCEDURE — 25010000002 CEFTRIAXONE PER 250 MG: Performed by: INTERNAL MEDICINE

## 2020-09-12 RX ORDER — GABAPENTIN 100 MG/1
100 CAPSULE ORAL 2 TIMES DAILY PRN
Status: DISCONTINUED | OUTPATIENT
Start: 2020-09-12 | End: 2020-09-15 | Stop reason: HOSPADM

## 2020-09-12 RX ORDER — GABAPENTIN 100 MG/1
100 CAPSULE ORAL 2 TIMES DAILY PRN
Status: DISCONTINUED | OUTPATIENT
Start: 2020-09-12 | End: 2020-09-12

## 2020-09-12 RX ADMIN — HEPARIN SODIUM 5000 UNITS: 5000 INJECTION INTRAVENOUS; SUBCUTANEOUS at 14:27

## 2020-09-12 RX ADMIN — CEFTRIAXONE SODIUM 1 G: 1 INJECTION, POWDER, FOR SOLUTION INTRAMUSCULAR; INTRAVENOUS at 20:01

## 2020-09-12 RX ADMIN — HYDROCODONE BITARTRATE AND ACETAMINOPHEN 1 TABLET: 7.5; 325 TABLET ORAL at 10:02

## 2020-09-12 RX ADMIN — LORAZEPAM 1 MG: 2 INJECTION INTRAMUSCULAR; INTRAVENOUS at 22:42

## 2020-09-12 RX ADMIN — HEPARIN SODIUM 5000 UNITS: 5000 INJECTION INTRAVENOUS; SUBCUTANEOUS at 05:33

## 2020-09-12 RX ADMIN — PRAMIPEXOLE DIHYDROCHLORIDE 1 MG: 1 TABLET ORAL at 20:00

## 2020-09-12 RX ADMIN — METOPROLOL TARTRATE 50 MG: 50 TABLET, FILM COATED ORAL at 19:58

## 2020-09-12 RX ADMIN — PRAMIPEXOLE DIHYDROCHLORIDE 1 MG: 1 TABLET ORAL at 07:58

## 2020-09-12 RX ADMIN — GABAPENTIN 100 MG: 100 CAPSULE ORAL at 19:58

## 2020-09-12 RX ADMIN — HEPARIN SODIUM 5000 UNITS: 5000 INJECTION INTRAVENOUS; SUBCUTANEOUS at 20:02

## 2020-09-12 RX ADMIN — GABAPENTIN 100 MG: 100 CAPSULE ORAL at 14:27

## 2020-09-12 RX ADMIN — AMIODARONE HYDROCHLORIDE 200 MG: 200 TABLET ORAL at 07:58

## 2020-09-12 RX ADMIN — METOPROLOL TARTRATE 50 MG: 50 TABLET, FILM COATED ORAL at 07:58

## 2020-09-12 RX ADMIN — HYDROCODONE BITARTRATE AND ACETAMINOPHEN 1 TABLET: 7.5; 325 TABLET ORAL at 21:01

## 2020-09-12 NOTE — PROGRESS NOTES
[facility] Medicine Services  PROGRESS NOTE    Patient Name: Zhanna Mccabe  : 1940  MRN: [id number]    Date of Admission: 2020  Primary Care Physician: Mackenzie Whitten MD    Subjective   Subjective     CC:  Weakness    HPI:  Complaining of some tingling pain in both of her lower legs.  Sodium improved    Review of Systems  Gen- No fevers, chills  CV- No chest pain, palpitations  Resp- No cough, dyspnea  GI- No N/V/D, abd pain      Objective   Objective     Vital Signs:   Temp:  [97.6 °F (36.4 °C)-98.5 °F (36.9 °C)] 98.5 °F (36.9 °C)  Heart Rate:  [55-64] 64  Resp:  [16-18] 16  BP: (109-128)/(47-82) 109/61        Physical Exam:  Constitutional: No acute distress, awake, alert  HENT: NCAT, mucous membranes moist  Respiratory: Clear to auscultation bilaterally, respiratory effort normal   Cardiovascular: RRR, no murmurs  Gastrointestinal: Positive bowel sounds, soft, nontender, nondistended  Musculoskeletal: trace edema  Psychiatric: Appropriate affect, cooperative  Neurologic: Oriented to self and time, speech clear  Skin: No rashes    Results Reviewed:  Results from last 7 days   Lab Units 09/10/20  0611 20  04120  1438   WBC 10*3/mm3 9.41 6.44 8.59   HEMOGLOBIN g/dL 11.9* 10.5* 12.0   HEMATOCRIT % 37.2 33.6* 37.4   PLATELETS 10*3/mm3 289 218 250   PROCALCITONIN ng/mL  --   --  0.17     Results from last 7 days   Lab Units 20  0400 20  2305 20  1814  09/10/20  0611  20  0412  20  1438   SODIUM mmol/L 133* 131* 129*   < > 127*   < > 120*   < > 116*   POTASSIUM mmol/L  --   --   --   --  3.8  --  4.2  --  4.6   CHLORIDE mmol/L  --   --   --   --  91*  --  86*  --  81*   CO2 mmol/L  --   --   --   --  23.0  --  24.0  --  23.0   BUN mg/dL  --   --   --   --  23  --  27*  --  28*   CREATININE mg/dL  --   --   --   --  0.96  --  0.93  --  1.12*   GLUCOSE mg/dL  --   --   --   --  139*  --  104*  --  113*   CALCIUM mg/dL  --   --   --    --  9.5  --  8.9  --  9.8   ALT (SGPT) U/L  --   --   --   --   --   --  111*  --  114*   AST (SGOT) U/L  --   --   --   --   --   --  107*  --  121*   TROPONIN T ng/mL  --   --   --   --   --   --   --   --  <0.010   PROBNP pg/mL  --   --   --   --   --   --   --   --  1,159.0    < > = values in this interval not displayed.     Estimated Creatinine Clearance: 44.4 mL/min (by C-G formula based on SCr of 0.96 mg/dL).    Microbiology Results Abnormal     Procedure Component Value - Date/Time    Urine Culture - Urine, Urine, Catheter [[id number]]  (Normal) Collected: 09/08/20 1650    Lab Status: Final result Specimen: Urine, Catheter Updated: 09/09/20 2207     Urine Culture No growth    COVID-19,[facility], NP SWAB IN [place] VIRAL TRANSPORT MEDIA 24-30 HR TAT - Swab, Nasopharynx [[id number]] Collected: 09/08/20 1818    Lab Status: Final result Specimen: Swab from Nasopharynx Updated: 09/09/20 1501     SARS-CoV-2 ALAN Not Detected          Imaging Results (Last 24 Hours)     ** No results found for the last 24 hours. **               I have reviewed the medications:  Scheduled Meds:amiodarone, 200 mg, Oral, Daily  cefTRIAXone, 1 g, Intravenous, Q24H  heparin (porcine), 5,000 Units, Subcutaneous, Q8H  metoprolol tartrate, 50 mg, Oral, BID  pramipexole, 1 mg, Oral, BID  sodium chloride, 10 mL, Intravenous, Q12H      Continuous Infusions:   PRN Meds:.docusate sodium  •  gabapentin  •  haloperidol  •  haloperidol lactate  •  HYDROcodone-acetaminophen  •  LORazepam  •  sodium chloride  •  sodium chloride    Assessment/Plan   Assessment & Plan     Active Hospital Problems    Diagnosis  POA   • **Acute hyponatremia [E87.1]  Yes   • HTN (hypertension) [I10]  Yes   • HLD (hyperlipidemia) [E78.5]  Unknown   • Depression [F32.9]  Unknown   • A-fib (CMS/HCC) [I48.91]  Unknown   • Fall [W19.XXXA]  Unknown   • Generalized weakness [R53.1]  Unknown   • Lung nodule [R91.1]  Unknown   • Hiatal hernia [K44.9]  Unknown   • Elevated LFTs  [R79.89]  Unknown   • HALLIE (acute kidney injury) (CMS/HCC) [N17.9]  Yes   • Acute UTI (urinary tract infection) [N39.0]  Yes   • Hypoxia [R09.02]  Unknown      Resolved Hospital Problems   No resolved problems to display.        Brief Hospital Course to date:  Zhanna Mccabe is a 80 y.o. female with afib on chronic anticoagulation, HTN, HL, IBS, RLS, and nocturnal oxygen use, with recent T/L spine kyphoplasties here with dysuria and AMS     Severe hyponatremia  --slowly improving, appropriately  --cymbalta/triamterene held  --likely ADH mechanism, continue fluid restriction     Encephalopathy  --hospital delirium, coupled with hyponatremia and sleep deprivation  --recent UTI  --mobilize/PT/OT  --better    Hypoxia  Lung nodue  --CT with pleural based nodule, outpatient pulmonary clinic follow up  --on BIPAP/oxygen at night  --monitor     Acute endplate L3 compression deformity  --NSG consulted, likely not acute, pain control, mobilize  --MRI done  --PT/OT     UTI  -- continue ceftriaxone for 5 days, culture negative to date, no symptoms, no fevers, DC soon     Elevated LFTs  --US abnormal, mild elevation in alk phos/angel  --MRCP - Status post cholecystectomy with post cholecystectomy ectasia of  limited evaluation however no obvious stricturing, contour irregularity  or filling defect to suggest choledocholithiasis most consistent with  post cholecystectomy ectasia measuring up to 12 mm in diameter. No  pancreas divisum anatomy     Afib  --xarelto held, on BB/amio  --rate stable, resume xarelto pending MRCP results, need for procedure determined  --restart xarelto at dc     HTN  --hold maxzide     Depression  --hold cymbalta       DVT Prophylaxis:  heparin      Disposition: I expect the patient to be discharged hopefully Monday to the [facility]    CODE STATUS:   Code Status and Medical Interventions:   Ordered at: 09/08/20 2035     Level Of Support Discussed With:    Patient     Code Status:    CPR     Medical  Interventions (Level of Support Prior to Arrest):    Full         Electronically signed by Shahla Barraza DO, 09/12/20, 14:04 EDT.

## 2020-09-12 NOTE — PLAN OF CARE
Problem: Patient Care Overview  Goal: Plan of Care Review  Outcome: Ongoing, Progressing  Flowsheets (Taken 9/12/2020 5175)  Progress: improving  Plan of Care Reviewed With: patient  Outcome Summary: Pt. agreeable to EOB & OOB activity. Bed mobility: min A & v/c's, EOB sitting balance: supervision, LBD: max A, bathing: mod A, grooming: set up, STS: min A, t/f to chair: mod A using RW. Pt.'s strength & functional mobility are improving. Recommend SNF upon d/c.

## 2020-09-12 NOTE — THERAPY TREATMENT NOTE
Patient Name: Zhanna Mccabe  : 1940    MRN: [id number]                              Today's Date: 2020       Admit Date: 2020    Visit Dx:     ICD-10-CM ICD-9-CM   1. Acute hyponatremia  E87.1 276.1   2. Frequent falls  R29.6 V15.88   3. Acute on chronic alteration in mental status  R41.82 780.09   4. Hypoxemia  R09.02 799.02   5. Elevated blood pressure reading with diagnosis of hypertension  I10 401.9   6. Impaired mobility and ADLs  Z74.09 V49.89    Z78.9      Patient Active Problem List   Diagnosis   • Acute hyponatremia   • HTN (hypertension)   • HLD (hyperlipidemia)   • Depression   • A-fib (CMS/HCC)   • Fall   • Generalized weakness   • Lung nodule   • Hiatal hernia   • Elevated LFTs   • HALLIE (acute kidney injury) (CMS/HCC)   • Acute UTI (urinary tract infection)   • Hypoxia     Past Medical History:   Diagnosis Date   • A-fib (CMS/HCC)    • Breast cancer (CMS/HCC)     left breast   • Depression    • High cholesterol    • Hypertension    • IBS (irritable bowel syndrome)    • Restless leg      Past Surgical History:   Procedure Laterality Date   • ARM SKIN LESION BIOPSY / EXCISION      melanoma   • BLADDER REPAIR      prolapse   • BREAST BIOPSY     • CHOLECYSTECTOMY     • HAND SURGERY Right     4th & 5th fingers   • HYSTERECTOMY     • HYSTERECTOMY     • KNEE SURGERY Left     ORIF   • MASTECTOMY Left    • MASTECTOMY       General Information     Row Name 20 1402          OT Time and Intention    Document Type  therapy note (daily note)  -SD     Mode of Treatment  occupational therapy  -SD     Row Name 20 1402          General Information    Existing Precautions/Restrictions  fall  -SD     Row Name 20 1402          Safety Issues, Functional Mobility    Safety Issues Affecting Function (Mobility)  insight into deficits/self-awareness;safety precaution awareness;safety precautions follow-through/compliance  -SD     Impairments Affecting Function (Mobility)   balance;coordination;cognition;endurance/activity tolerance;postural/trunk control;strength  -SD       User Key  (r) = Recorded By, (t) = Taken By, (c) = Cosigned By    Initials Name Provider Type    Shelbie Baptiste, OT Occupational Therapist        Mobility/ADL's     Row Name 09/12/20 1402          Bed Mobility    Bed Mobility  scooting/bridging;supine-sit;rolling left  -SD     Rolling Left [place] (Bed Mobility)  verbal cues  -SD     Scooting/Bridging [place] (Bed Mobility)  minimum assist (75% patient effort)  -SD     Supine-Sit [place] (Bed Mobility)  minimum assist (75% patient effort)  -SD     Row Name 09/12/20 1402          Transfers    Transfers  bed-chair transfer;sit-stand transfer  -SD     Bed-Chair [place] (Transfers)  minimum assist (75% patient effort);verbal cues  -SD     Assistive Device (Bed-Chair Transfers)  walker, front-wheeled  -SD     Sit-Stand [place] (Transfers)  minimum assist (75% patient effort);verbal cues  -SD     Row Name 09/12/20 1402          Sit-Stand Transfer    Assistive Device (Sit-Stand Transfers)  walker, front-wheeled  -SD     Row Name 09/12/20 1402          Activities of Daily Living    52053 - OT Self Care/Mgmt Minutes  10  -SD     BADL Assessment/Intervention  bathing;lower body dressing;grooming  -SD     Row Name 09/12/20 1402          Bathing Assessment/Intervention    Davie Level (Bathing)  bathing skills;upper body;moderate assist (50% patient effort);other (see comments) back  -SD     Position (Bathing)  edge of bed sitting  -SD     Row Name 09/12/20 1402          Lower Body Dressing Assessment/Training    Davie Level (Lower Body Dressing)  don;socks;maximum assist (25% patient effort)  -SD     Position (Lower Body Dressing)  edge of bed sitting  -SD     Row Name 09/12/20 1402          Grooming Assessment/Training    Davie Level (Grooming)  hair care, combing/brushing;set up  -SD     Position (Grooming)  edge of  bed sitting  -SD       User Key  (r) = Recorded By, (t) = Taken By, (c) = Cosigned By    Initials Name Provider Type    Shelbie Baptiste OT Occupational Therapist        Obj/Interventions     Row Name 09/12/20 1443          Shoulder (Therapeutic Exercise)    Shoulder (Therapeutic Exercise)  AROM (active range of motion)  -SD     Shoulder AROM (Therapeutic Exercise)  bilateral;flexion;horizontal aBduction/aDduction;scapular elevation;scapular retraction;sitting;10 repetitions  -SD     Row Name 09/12/20 1443          Elbow/Forearm (Therapeutic Exercise)    Elbow/Forearm (Therapeutic Exercise)  AROM (active range of motion)  -SD     Elbow/Forearm AROM (Therapeutic Exercise)  bilateral;sitting;10 repetitions  -SD     Row Name 09/12/20 1443          Balance    Balance Assessment  sitting static balance;standing static balance  -SD     Static Sitting Balance  mild impairment  -SD     Static Standing Balance  moderate impairment  -SD     Row Name 09/12/20 [facility]          Therapeutic Exercise    Therapeutic Exercise  shoulder;elbow/forearm  -SD       User Key  (r) = Recorded By, (t) = Taken By, (c) = Cosigned By    Initials Name Provider Type    Shelbie Baptiste OT Occupational Therapist        [unfilled]  Clinical Impression     Row Name 09/12/20 144          Pain Assessment    Additional Documentation  Pain Scale: Numbers Pre/Post-Treatment (Group)  -SD     Row Name 09/12/20 1443          Pain Scale: Numbers Pre/Post-Treatment    Pretreatment Pain Rating  0/10 - no pain  -SD     Posttreatment Pain Rating  2/10  -SD     Pain Location - Side  Bilateral  -SD     Pain Location - Orientation  lower  -SD     Pain Location  back  -SD     Pre/Posttreatment Pain Comment  RN viewed bruise on pt.'s lower bacl  -SD     Row Name 09/12/20 1447          Plan of Care Review    Plan of Care Reviewed With  patient  -SD     Progress  improving  -SD     Outcome Summary  Pt. agreeable to EOB & OOB activity. Bed mobility:  min A & v/c's, EOB sitting balance: supervision, LBD: max A, bathing: mod A, grooming: set up, STS: min A, t/f to chair: mod A using RW. Pt.'s strength & functional mobility are improving. Recommend SNF upon d/c.  -SD     Row Name 09/12/20 1444          Therapy Assessment/Plan (OT)    Rehab Potential (OT)  good, to achieve stated therapy goals  -SD     Row Name 09/12/20 1444          Therapy Plan Review/Discharge Plan (OT)    Anticipated Discharge Disposition (OT)  skilled nursing facility  -SD     Row Name 09/12/20 1444          Vital Signs    Post SpO2 (%)  94  -SD     O2 Delivery Post Treatment  supplemental O2  -SD     Pre Patient Position  Supine  -SD     Intra Patient Position  Standing  -SD     Post Patient Position  Sitting  -SD     Row Name 09/12/20 1444          Positioning and Restraints    Pre-Treatment Position  in bed  -SD     Post Treatment Position  chair  -SD     In Chair  notified nsg;reclined;call light within reach;encouraged to call for assist;with nsg;legs elevated  -SD       User Key  (r) = Recorded By, (t) = Taken By, (c) = Cosigned By    Initials Name Provider Type    Shelbie Baptiste OT Occupational Therapist        Outcome Measures     Row Name 09/12/20 1402          How much help from another is currently needed...    Putting on and taking off regular lower body clothing?  2  -SD     Bathing (including washing, rinsing, and drying)  2  -SD     Toileting (which includes using toilet bed pan or urinal)  2  -SD     Putting on and taking off regular upper body clothing  3  -SD     Taking care of personal grooming (such as brushing teeth)  3  -SD     Eating meals  4  -SD     AM-PAC 6 Clicks Score (OT)  16  -SD     Row Name 09/12/20 1402          Functional Assessment    Outcome Measure Options  AM-PAC 6 Clicks Daily Activity (OT)  -SD       User Key  (r) = Recorded By, (t) = Taken By, (c) = Cosigned By    Initials Name Provider Type    Shelbie Baptiste OT Occupational  Therapist        Occupational Therapy Education                 Title: PT OT SLP Therapies (In Progress)     Topic: Occupational Therapy (In Progress)     Point: ADL training (In Progress)     Description:   Instruct learner(s) on proper safety adaptation and remediation techniques during self care or transfers.   Instruct in proper use of assistive devices.              Learning Progress Summary           Patient Acceptance, E, NR,NL by  at 9/9/2020 0828                   Point: Home exercise program (Not Started)     Description:   Instruct learner(s) on appropriate technique for monitoring, assisting and/or progressing therapeutic exercises/activities.              Learner Progress:  Not documented in this visit.          Point: Precautions (In Progress)     Description:   Instruct learner(s) on prescribed precautions during self-care and functional transfers.              Learning Progress Summary           Patient Acceptance, E, NR,NL by  at 9/9/2020 0828                   Point: Body mechanics (In Progress)     Description:   Instruct learner(s) on proper positioning and spine alignment during self-care, functional mobility activities and/or exercises.              Learning Progress Summary           Patient Acceptance, E, NR,NL by  at 9/9/2020 0828                               User Key     Initials Effective Dates Name Provider Type Discipline     07/18/19 -  Shelli Arzate OT Occupational Therapist OT              OT Recommendation and Plan  Retired Outcome Summary/Treatment Plan (OT)  Anticipated Discharge Disposition (OT): skilled nursing facility  Plan of Care Review  Plan of Care Reviewed With: patient  Progress: improving  Outcome Summary: Pt. agreeable to EOB & OOB activity. Bed mobility: min A & v/c's, EOB sitting balance: supervision, LBD: max A, bathing: mod A, grooming: set up, STS: min A, t/f to chair: mod A using RW. Pt.'s strength & functional mobility are improving. Recommend SNF upon  d/c.  Plan of Care Reviewed With: patient  Outcome Summary: Pt. agreeable to EOB & OOB activity. Bed mobility: min A & v/c's, EOB sitting balance: supervision, LBD: max A, bathing: mod A, grooming: set up, STS: min A, t/f to chair: mod A using RW. Pt.'s strength & functional mobility are improving. Recommend SNF upon d/c.     Time Calculation:   Time Calculation- OT     Row Name 09/12/20 1449 09/12/20 1402          Time Calculation- OT    OT Start Time  1402  -SD  --     OT Received On  09/12/20  -SD  --     OT Goal Re-Cert Due Date  09/19/20  -SD  --        Timed Charges    68807 - OT Self Care/Mgmt Minutes  --  10  -SD       User Key  (r) = Recorded By, (t) = Taken By, (c) = Cosigned By    Initials Name Provider Type    Shelbie Baptiste OT Occupational Therapist        Therapy Charges for Today     Code Description Service Date Service Provider Modifiers Qty    67862926770 HC OT THERAPEUTIC ACT EA 15 MIN 9/12/2020 Shelbie Mayen OT GO 1    23167223119 HC OT SELF CARE/MGMT/TRAIN EA 15 MIN 9/12/2020 Shelbie Mayen OT GO 1               Shelbie Mayen OT  9/12/2020

## 2020-09-12 NOTE — PROGRESS NOTES
"   LOS: 4 days    Patient Care Team:  Mackenzie Whitten MD as PCP - General (Internal Medicine)    Chief Complaint:  Fall and confusion.     Subjective     Interval History:     No acute events overnight. Na 133 from 131    Review of Systems:       Objective     Vital Sign Min/Max for last 24 hours  Temp  Min: 97.6 °F (36.4 °C)  Max: 98.5 °F (36.9 °C)   BP  Min: 109/61  Max: 128/47   Pulse  Min: 55  Max: 64   Resp  Min: 16  Max: 18   No data recorded   No data recorded   Weight  Min: 70.1 kg (154 lb 8 oz)  Max: 70.1 kg (154 lb 8 oz)     Flowsheet Rows      First Filed Value   Admission Height  165.1 cm (65\") Documented at 09/08/2020 1417   Admission Weight  70.3 kg (155 lb) Documented at 09/08/2020 1417          No intake/output data recorded.  I/O last 3 completed shifts:  In: 240 [P.O.:240]  Out: 950 [Urine:950]    Physical Exam:     General Appearance:   Confused, in no acute distress   Head:    Normocephalic, atraumatic   Eyes:            Conjunctivae and sclerae normal,EOMI   Ears:    Ears appear intact with no abnormalities noted   Throat:   No oral lesions, no thrush, oral mucosa moist   Neck:   Supple,  no JVD       Lungs:     Clear to auscultation,respirations regular, even and                   unlabored    Heart:    Regular rhythm and normal rate, normal S1 and S2       Abdomen:     Normal bowel sounds, no masses, no organomegaly, soft        non-tender, non-distended       Extremities:   Moves all extremities well, no edema, no cyanosis, no              redness   Pulses:   Pulses palpable and equal bilaterally           Neurologic:   Confused. No focal deficit noted grossly.        WBC WBC   Date Value Ref Range Status   09/10/2020 9.41 3.40 - 10.80 10*3/mm3 Final      HGB Hemoglobin   Date Value Ref Range Status   09/10/2020 11.9 (L) 12.0 - 15.9 g/dL Final      HCT Hematocrit   Date Value Ref Range Status   09/10/2020 37.2 34.0 - 46.6 % Final      Platlets No results found for: LABPLAT   MCV MCV   Date " Value Ref Range Status   09/10/2020 81.9 79.0 - 97.0 fL Final          Sodium Sodium   Date Value Ref Range Status   09/12/2020 133 (L) 136 - 145 mmol/L Final   09/11/2020 131 (L) 136 - 145 mmol/L Final   09/11/2020 129 (L) 136 - 145 mmol/L Final   09/11/2020 131 (L) 136 - 145 mmol/L Final   09/11/2020 127 (L) 136 - 145 mmol/L Final   09/11/2020 129 (L) 136 - 145 mmol/L Final   09/10/2020 130 (L) 136 - 145 mmol/L Final   09/10/2020 128 (L) 136 - 145 mmol/L Final   09/10/2020 127 (L) 136 - 145 mmol/L Final   09/10/2020 127 (L) 136 - 145 mmol/L Final   09/10/2020 124 (L) 136 - 145 mmol/L Final   09/09/2020 122 (L) 136 - 145 mmol/L Final      Potassium Potassium   Date Value Ref Range Status   09/10/2020 3.8 3.5 - 5.2 mmol/L Final      Chloride Chloride   Date Value Ref Range Status   09/10/2020 91 (L) 98 - 107 mmol/L Final      CO2 CO2   Date Value Ref Range Status   09/10/2020 23.0 22.0 - 29.0 mmol/L Final      BUN BUN   Date Value Ref Range Status   09/10/2020 23 8 - 23 mg/dL Final      Creatinine Creatinine   Date Value Ref Range Status   09/10/2020 0.96 0.57 - 1.00 mg/dL Final      Calcium Calcium   Date Value Ref Range Status   09/10/2020 9.5 8.6 - 10.5 mg/dL Final      PO4 No results found for: CAPO4   Albumin No results found for: ALBUMIN   Magnesium No results found for: MG   Uric Acid No results found for: URICACID        Results Review:     I reviewed the patient's new clinical results.    amiodarone, 200 mg, Oral, Daily  cefTRIAXone, 1 g, Intravenous, Q24H  heparin (porcine), 5,000 Units, Subcutaneous, Q8H  metoprolol tartrate, 50 mg, Oral, BID  pramipexole, 1 mg, Oral, BID  sodium chloride, 10 mL, Intravenous, Q12H           Medication Review:     Assessment/Plan       Acute hyponatremia    HTN (hypertension)    HLD (hyperlipidemia)    Depression    A-fib (CMS/HCC)    Fall    Generalized weakness    Lung nodule    Hiatal hernia    Elevated LFTs    HALLIE (acute kidney injury) (CMS/HCC)    Acute UTI (urinary  tract infection)    Hypoxia      1- Hyponatremia - Symptomatic with frequent fall and confusion - [facility]vol[facility] - Urine sodium 471, urine sodium 72 and serum osmolality 260. Patient recently started on Triamterene/HCTZ while on Cymbalta. Recently drinking lots of fluids.      Plan:   -Should stay away from Triamterene/HCTZ and Cymbalta in future  Avoid Seroquel - Can cause SIADH  - D/c serial Na monitoring. Check BMP daily          Bear Morales MD  09/12/20  16:17 EDT

## 2020-09-13 LAB
ALBUMIN SERPL-MCNC: 3.6 G/DL (ref 3.5–5.2)
ANION GAP SERPL CALCULATED.3IONS-SCNC: 9 MMOL/L (ref 5–15)
BASOPHILS # BLD AUTO: 0.04 10*3/MM3 (ref 0–0.2)
BASOPHILS NFR BLD AUTO: 0.8 % (ref 0–1.5)
BUN SERPL-MCNC: 33 MG/DL (ref 8–23)
BUN/CREAT SERPL: 34.7 (ref 7–25)
CALCIUM SPEC-SCNC: 8.8 MG/DL (ref 8.6–10.5)
CHLORIDE SERPL-SCNC: 101 MMOL/L (ref 98–107)
CO2 SERPL-SCNC: 26 MMOL/L (ref 22–29)
CREAT SERPL-MCNC: 0.95 MG/DL (ref 0.57–1)
DEPRECATED RDW RBC AUTO: 51.4 FL (ref 37–54)
EOSINOPHIL # BLD AUTO: 0.1 10*3/MM3 (ref 0–0.4)
EOSINOPHIL NFR BLD AUTO: 1.9 % (ref 0.3–6.2)
ERYTHROCYTE [DISTWIDTH] IN BLOOD BY AUTOMATED COUNT: 16.8 % (ref 12.3–15.4)
GFR SERPL CREATININE-BSD FRML MDRD: 56 ML/MIN/1.73
GLUCOSE SERPL-MCNC: 120 MG/DL (ref 65–99)
HCT VFR BLD AUTO: 35.2 % (ref 34–46.6)
HGB BLD-MCNC: 10.5 G/DL (ref 12–15.9)
IMM GRANULOCYTES # BLD AUTO: 0.08 10*3/MM3 (ref 0–0.05)
IMM GRANULOCYTES NFR BLD AUTO: 1.5 % (ref 0–0.5)
LYMPHOCYTES # BLD AUTO: 1.11 10*3/MM3 (ref 0.7–3.1)
LYMPHOCYTES NFR BLD AUTO: 20.9 % (ref 19.6–45.3)
MCH RBC QN AUTO: 25.6 PG (ref 26.6–33)
MCHC RBC AUTO-ENTMCNC: 29.8 G/DL (ref 31.5–35.7)
MCV RBC AUTO: 85.9 FL (ref 79–97)
MONOCYTES # BLD AUTO: 0.88 10*3/MM3 (ref 0.1–0.9)
MONOCYTES NFR BLD AUTO: 16.6 % (ref 5–12)
NEUTROPHILS NFR BLD AUTO: 3.09 10*3/MM3 (ref 1.7–7)
NEUTROPHILS NFR BLD AUTO: 58.3 % (ref 42.7–76)
NRBC BLD AUTO-RTO: 0 /100 WBC (ref 0–0.2)
PHOSPHATE SERPL-MCNC: 3.5 MG/DL (ref 2.5–4.5)
PLATELET # BLD AUTO: 289 10*3/MM3 (ref 140–450)
PMV BLD AUTO: 9.8 FL (ref 6–12)
POTASSIUM SERPL-SCNC: 3.9 MMOL/L (ref 3.5–5.2)
RBC # BLD AUTO: 4.1 10*6/MM3 (ref 3.77–5.28)
SODIUM SERPL-SCNC: 136 MMOL/L (ref 136–145)
SODIUM SERPL-SCNC: 136 MMOL/L (ref 136–145)
WBC # BLD AUTO: 5.3 10*3/MM3 (ref 3.4–10.8)

## 2020-09-13 PROCEDURE — 25010000002 HEPARIN (PORCINE) PER 1000 UNITS: Performed by: HOSPITALIST

## 2020-09-13 PROCEDURE — 97116 GAIT TRAINING THERAPY: CPT | Performed by: PHYSICAL THERAPIST

## 2020-09-13 PROCEDURE — 80069 RENAL FUNCTION PANEL: CPT | Performed by: INTERNAL MEDICINE

## 2020-09-13 PROCEDURE — 99232 SBSQ HOSP IP/OBS MODERATE 35: CPT | Performed by: INTERNAL MEDICINE

## 2020-09-13 PROCEDURE — 97530 THERAPEUTIC ACTIVITIES: CPT | Performed by: PHYSICAL THERAPIST

## 2020-09-13 PROCEDURE — 85025 COMPLETE CBC W/AUTO DIFF WBC: CPT | Performed by: INTERNAL MEDICINE

## 2020-09-13 PROCEDURE — 97110 THERAPEUTIC EXERCISES: CPT | Performed by: PHYSICAL THERAPIST

## 2020-09-13 RX ORDER — CHOLECALCIFEROL (VITAMIN D3) 125 MCG
5 CAPSULE ORAL NIGHTLY PRN
Status: DISCONTINUED | OUTPATIENT
Start: 2020-09-13 | End: 2020-09-15 | Stop reason: HOSPADM

## 2020-09-13 RX ADMIN — METOPROLOL TARTRATE 50 MG: 50 TABLET, FILM COATED ORAL at 08:51

## 2020-09-13 RX ADMIN — SODIUM CHLORIDE, PRESERVATIVE FREE 10 ML: 5 INJECTION INTRAVENOUS at 08:52

## 2020-09-13 RX ADMIN — HYDROCODONE BITARTRATE AND ACETAMINOPHEN 1 TABLET: 7.5; 325 TABLET ORAL at 11:25

## 2020-09-13 RX ADMIN — SODIUM CHLORIDE, PRESERVATIVE FREE 10 ML: 5 INJECTION INTRAVENOUS at 20:24

## 2020-09-13 RX ADMIN — HEPARIN SODIUM 5000 UNITS: 5000 INJECTION INTRAVENOUS; SUBCUTANEOUS at 06:45

## 2020-09-13 RX ADMIN — DOCUSATE SODIUM 100 MG: 100 CAPSULE, LIQUID FILLED ORAL at 20:22

## 2020-09-13 RX ADMIN — DOCUSATE SODIUM 100 MG: 100 CAPSULE, LIQUID FILLED ORAL at 10:17

## 2020-09-13 RX ADMIN — AMIODARONE HYDROCHLORIDE 200 MG: 200 TABLET ORAL at 08:51

## 2020-09-13 RX ADMIN — PRAMIPEXOLE DIHYDROCHLORIDE 1 MG: 1 TABLET ORAL at 20:23

## 2020-09-13 RX ADMIN — PRAMIPEXOLE DIHYDROCHLORIDE 1 MG: 1 TABLET ORAL at 08:51

## 2020-09-13 RX ADMIN — HEPARIN SODIUM 5000 UNITS: 5000 INJECTION INTRAVENOUS; SUBCUTANEOUS at 13:39

## 2020-09-13 RX ADMIN — MELATONIN TAB 5 MG 5 MG: 5 TAB at 23:13

## 2020-09-13 RX ADMIN — HEPARIN SODIUM 5000 UNITS: 5000 INJECTION INTRAVENOUS; SUBCUTANEOUS at 20:22

## 2020-09-13 NOTE — PROGRESS NOTES
"   LOS: 5 days    Patient Care Team:  Mackenzie Whitten MD as PCP - General (Internal Medicine)    Chief Complaint:  Fall and confusion.     Subjective     Interval History:     No acute events overnight. Na 133>136    Review of Systems:       Objective     Vital Sign Min/Max for last 24 hours  Temp  Min: 98.3 °F (36.8 °C)  Max: 98.5 °F (36.9 °C)   BP  Min: 120/56  Max: 151/49   Pulse  Min: 55  Max: 63   Resp  Min: 16  Max: 18   SpO2  Min: 97 %  Max: 97 %   No data recorded   Weight  Min: 68.9 kg (151 lb 12.8 oz)  Max: 68.9 kg (151 lb 14.4 oz)     Flowsheet Rows      First Filed Value   Admission Height  165.1 cm (65\") Documented at 09/08/2020 1417   Admission Weight  70.3 kg (155 lb) Documented at 09/08/2020 1417          I/O this shift:  In: -   Out: 600 [Urine:600]  I/O last 3 completed shifts:  In: -   Out: 850 [Urine:850]    Physical Exam:     General Appearance:   Confused, in no acute distress   Head:    Normocephalic, atraumatic   Eyes:            Conjunctivae and sclerae normal,EOMI   Ears:    Ears appear intact with no abnormalities noted   Throat:   No oral lesions, no thrush, oral mucosa moist   Neck:   Supple,  no JVD       Lungs:     Clear to auscultation,respirations regular, even and                   unlabored    Heart:    Regular rhythm and normal rate, normal S1 and S2       Abdomen:     Normal bowel sounds, no masses, no organomegaly, soft        non-tender, non-distended       Extremities:   Moves all extremities well, no edema, no cyanosis, no              redness   Pulses:   Pulses palpable and equal bilaterally           Neurologic:   Confused. No focal deficit noted grossly.        WBC WBC   Date Value Ref Range Status   09/13/2020 5.30 3.40 - 10.80 10*3/mm3 Final      HGB Hemoglobin   Date Value Ref Range Status   09/13/2020 10.5 (L) 12.0 - 15.9 g/dL Final      HCT Hematocrit   Date Value Ref Range Status   09/13/2020 35.2 34.0 - 46.6 % Final      Platlets No results found for: LABPLAT   MCV " MCV   Date Value Ref Range Status   09/13/2020 85.9 79.0 - 97.0 fL Final          Sodium Sodium   Date Value Ref Range Status   09/13/2020 136 136 - 145 mmol/L Final   09/12/2020 136 136 - 145 mmol/L Final   09/12/2020 135 (L) 136 - 145 mmol/L Final   09/12/2020 133 (L) 136 - 145 mmol/L Final   09/11/2020 131 (L) 136 - 145 mmol/L Final   09/11/2020 129 (L) 136 - 145 mmol/L Final   09/11/2020 131 (L) 136 - 145 mmol/L Final   09/11/2020 127 (L) 136 - 145 mmol/L Final   09/11/2020 129 (L) 136 - 145 mmol/L Final   09/10/2020 130 (L) 136 - 145 mmol/L Final      Potassium Potassium   Date Value Ref Range Status   09/13/2020 3.9 3.5 - 5.2 mmol/L Final      Chloride Chloride   Date Value Ref Range Status   09/13/2020 101 98 - 107 mmol/L Final      CO2 CO2   Date Value Ref Range Status   09/13/2020 26.0 22.0 - 29.0 mmol/L Final      BUN BUN   Date Value Ref Range Status   09/13/2020 33 (H) 8 - 23 mg/dL Final      Creatinine Creatinine   Date Value Ref Range Status   09/13/2020 0.95 0.57 - 1.00 mg/dL Final      Calcium Calcium   Date Value Ref Range Status   09/13/2020 8.8 8.6 - 10.5 mg/dL Final      PO4 No results found for: CAPO4   Albumin Albumin   Date Value Ref Range Status   09/13/2020 3.60 3.50 - 5.20 g/dL Final      Magnesium No results found for: MG   Uric Acid No results found for: URICACID        Results Review:     I reviewed the patient's new clinical results.    amiodarone, 200 mg, Oral, Daily  heparin (porcine), 5,000 Units, Subcutaneous, Q8H  metoprolol tartrate, 50 mg, Oral, BID  pramipexole, 1 mg, Oral, BID  sodium chloride, 10 mL, Intravenous, Q12H           Medication Review:     Assessment/Plan       Acute hyponatremia    HTN (hypertension)    HLD (hyperlipidemia)    Depression    A-fib (CMS/HCC)    Fall    Generalized weakness    Lung nodule    Hiatal hernia    Elevated LFTs    HALLIE (acute kidney injury) (CMS/HCC)    Acute UTI (urinary tract infection)    Hypoxia      1- Hyponatremia - Symptomatic with  frequent fall and confusion - Euvolumic - Urine sodium 471, urine sodium 72 and serum osmolality 260. Patient recently started on Triamterene/HCTZ while on Cymbalta. Recently drinking lots of fluids.      Plan:   -Should stay away from Triamterene/HCTZ and Cymbalta in future  Avoid Seroquel - Can cause SIADH  - Will sign off at this point.          Bear Morales MD  09/13/20  14:43 EDT

## 2020-09-13 NOTE — PROGRESS NOTES
[facility] Medicine Services  PROGRESS NOTE    Patient Name: Zhanna Mccabe  : 1940  MRN: [id number]    Date of Admission: 2020  Primary Care Physician: Mackenzie Whitten MD    Subjective   Subjective     CC:  Weakness, hyponatremia    HPI:  No relief with gabapentin.  Sodium resolved.  Waiting for rehab placement, no complaints other than legs bothering her at night    Review of Systems  Gen- No fevers, chills  CV- No chest pain, palpitations  Resp- No cough, dyspnea  GI- No N/V/D, abd pain    Objective   Objective     Vital Signs:   Temp:  [98.3 °F (36.8 °C)-98.5 °F (36.9 °C)] 98.5 °F (36.9 °C)  Heart Rate:  [55-63] 59  Resp:  [16-18] 18  BP: (120-151)/(49-58) 151/49        Physical Exam:  Constitutional: No acute distress, awake, alert  HENT: NCAT, mucous membranes moist  Respiratory: Clear to auscultation bilaterally, respiratory effort normal   Cardiovascular: RRR, no murmurs  Gastrointestinal: Positive bowel sounds, soft, nontender, nondistended  Musculoskeletal: No edema  Psychiatric: Appropriate affect, cooperative  Neurologic: Oriented to self, speech clear  Skin: No rashes    Results Reviewed:  Results from last 7 days   Lab Units 20  0543 09/10/20  0620  1438   WBC 10*3/mm3 5.30 9.41 6.44 8.59   HEMOGLOBIN g/dL 10.5* 11.9* 10.5* 12.0   HEMATOCRIT % 35.2 37.2 33.6* 37.4   PLATELETS 10*3/mm3 289 289 218 250   PROCALCITONIN ng/mL  --   --   --  0.17     Results from last 7 days   Lab Units 20  0543 20  2317 20  1602  09/10/20  0611  20  1438   SODIUM mmol/L 136 136 135*   < > 127*   < > 120*   < > 116*   POTASSIUM mmol/L 3.9  --   --   --  3.8  --  4.2  --  4.6   CHLORIDE mmol/L 101  --   --   --  91*  --  86*  --  81*   CO2 mmol/L 26.0  --   --   --  23.0  --  24.0  --  23.0   BUN mg/dL 33*  --   --   --  23  --  27*  --  28*   CREATININE mg/dL 0.95  --   --   --  0.96  --  0.93  --  1.12*   GLUCOSE  mg/dL 120*  --   --   --  139*  --  104*  --  113*   CALCIUM mg/dL 8.8  --   --   --  9.5  --  8.9  --  9.8   ALT (SGPT) U/L  --   --   --   --   --   --  111*  --  114*   AST (SGOT) U/L  --   --   --   --   --   --  107*  --  121*   TROPONIN T ng/mL  --   --   --   --   --   --   --   --  <0.010   PROBNP pg/mL  --   --   --   --   --   --   --   --  1,159.0    < > = values in this interval not displayed.     Estimated Creatinine Clearance: 44.5 mL/min (by C-G formula based on SCr of 0.95 mg/dL).    Microbiology Results Abnormal     Procedure Component Value - Date/Time    Urine Culture - Urine, Urine, Catheter [[id number]]  (Normal) Collected: 09/08/20 1650    Lab Status: Final result Specimen: Urine, Catheter Updated: 09/09/20 2207     Urine Culture No growth    COVID-19,[facility] SWAB [facility] [employer] VIR[facility] [employer] 24-30 HR TAT - Swab, Nasopharynx [[id number]] Collected: 09/08/20 1818    Lab Status: Final result Specimen: Swab from Nasopharynx Updated: 09/09/20 1501     SARS-CoV-2 ALAN Not Detected          Imaging Results (Last 24 Hours)     Procedure Component Value Units Date/Time    MRI Thoracic Spine Without Contrast [[id number]] Collected: 09/11/20 1052     Updated: 09/12/20 1604    Narrative:      EXAMINATION: MRI ABDOMEN WO CONTRAST MRCP-, MRI THORACIC SPINE WO  CONTRAST-, MRI LUMBAR SPINE WO CONTRAST- 09/11/2020     INDICATION: abnormal US, lfts; E87.1-Fdfv-srypyvngzh and hyponatremia;  R29.6-Repeated falls; R41.82-Altered mental status, unspecified;  R09.02-Hypoxemia; I10-Essential (primary) hypertension; Z74.09-Other  reduced mobility; Z78.9-Other specified health status     TECHNIQUE: Multiplanar MRI of the abdomen without intravenous contrast  following MRCP protocol, multiplanar MRI of the thoracic spine without  intravenous contrast, multiplanar MRI of the lumbar spine without  intravenous contrast.     COMPARISON: Lumbar spine radiographs 07/28/2020     FINDINGS:      ABDOMEN: Moderate motion  degraded examination. Lung bases demonstrate  asymmetric elevation of the right hemidiaphragm with adjacent  atelectasis otherwise lung bases are clear. Liver contains a few  scattered T2 hyperintense well-defined areas of likely hepatic cyst  formation. No significant signal drop on opposed phase imaging to  suggest steatotic component. Gallbladder is surgically absent. Motion  degraded MRCP sequences however noted common bile duct measuring up to  12 mm with tapering to the level of the ampulla identified of likely  postcholecystectomy ectasia without distinct intrahepatic biliary  dilatation or irregularity evident. No stricturing or shouldering  evident and no filling defect to suggest choledocholithiasis. No  pancreas divisum anatomy with normal branching anatomy of the pancreatic  duct. Pancreas and spleen grossly unremarkable. Adrenals without  distinct nodule. Kidneys without hydronephrosis or hydroureter.  Visualized GI tract grossly unremarkable without disproportionate  dilatation or mechanical obstructive process evident on partial imaging.  No ascites.     T-spine: Mild dextroscoliotic curvature of thoracic spine with sagittal  data sets revealing normal thoracic kyphotic curvature without focal  subluxation or listhesis. Vertebral body heights are preserved apart  from T11 and T12 levels which demonstrate inferior endplate irregularity  T11 and mild height loss along with T12 kyphoplasty repair. No  aggressive bone marrow signal findings of abnormal bone marrow edema  otherwise noted with grossly normal caliber and contour of the thoracic  cord and spinal canal. Paraspinal soft tissues unremarkable for  paraspinal hematoma or acute soft tissue findings in the paraspinal  region. No critical spinal canal stenosis by osseous means and no gross  neuroforaminal stenosis with perineural fat largely well preserved apart  from mild to moderate neuroforaminal stenosis at the right T9-T10 level.     L-spine:  Sagittal datasets reveal grossly normal alignment without focal  subluxation or listhesis however multilevel compression deformities with  kyphoplasty repair at L1 and L4 with associated artifact from  kyphoplasty repair. Mild retropulsion greatest at the L1 level where  there is posterior wall retropulsion involvement 6 mm creating at least  mild to moderate spinal canal stenosis at this level along with  intermixed spondylitic changes at the L3-L4 interspace level with mild  to moderate spinal canal stenosis and minimal 3 mm retropulsion L4 level  along with abnormal bone marrow signal and endplate irregularity of the  L3 level consistent with edema from acute or recent compression fracture  deformity with 25-50% height loss however no retropulsion. Facets are  well aligned. No critical spinal canal narrowing and no paraspinal  hematoma. SI joints grossly symmetric on limited imaging.       Impression:      1. Abdomen severely motion degraded demonstrating several areas of  well-defined T2 hyperintense lesions most consistent with small hepatic  cysts otherwise no parenchymal process evident or steatotic component.     2. Status post cholecystectomy with post cholecystectomy ectasia of  limited evaluation however no obvious stricturing, contour irregularity  or filling defect to suggest choledocholithiasis most consistent with  post cholecystectomy ectasia measuring up to 12 mm in diameter. No  pancreas divisum anatomy.     3. Acute or recent L3 compression fracture deformity involving the  superior endplate with 50% height loss greatest in the midportion  without retropulsion or posterior element involvement and no critical  spinal canal stenosis at this level with multilevel compression  deformities thoracolumbar junction and lumbar spine as detailed above  with prior kyphoplasty repairs at these areas.     D:  09/11/2020  E:  09/11/2020     This report was finalized on 9/12/2020 4:01 PM by Dr. Lincoln Mallory.        MRI abdomen wo contrast mrcp [[id number]] Collected: 09/11/20 1052     Updated: 09/12/20 1604    Narrative:      EXAMINATION: MRI ABDOMEN WO CONTRAST MRCP-, MRI THORACIC SPINE WO  CONTRAST-, MRI LUMBAR SPINE WO CONTRAST- 09/11/2020     INDICATION: abnormal US, lfts; E87.1-Fmkc-dackvaflzm and hyponatremia;  R29.6-Repeated falls; R41.82-Altered mental status, unspecified;  R09.02-Hypoxemia; I10-Essential (primary) hypertension; Z74.09-Other  reduced mobility; Z78.9-Other specified health status     TECHNIQUE: Multiplanar MRI of the abdomen without intravenous contrast  following MRCP protocol, multiplanar MRI of the thoracic spine without  intravenous contrast, multiplanar MRI of the lumbar spine without  intravenous contrast.     COMPARISON: Lumbar spine radiographs 07/28/2020     FINDINGS:      ABDOMEN: Moderate motion degraded examination. Lung bases demonstrate  asymmetric elevation of the right hemidiaphragm with adjacent  atelectasis otherwise lung bases are clear. Liver contains a few  scattered T2 hyperintense well-defined areas of likely hepatic cyst  formation. No significant signal drop on opposed phase imaging to  suggest steatotic component. Gallbladder is surgically absent. Motion  degraded MRCP sequences however noted common bile duct measuring up to  12 mm with tapering to the level of the ampulla identified of likely  postcholecystectomy ectasia without distinct intrahepatic biliary  dilatation or irregularity evident. No stricturing or shouldering  evident and no filling defect to suggest choledocholithiasis. No  pancreas divisum anatomy with normal branching anatomy of the pancreatic  duct. Pancreas and spleen grossly unremarkable. Adrenals without  distinct nodule. Kidneys without hydronephrosis or hydroureter.  Visualized GI tract grossly unremarkable without disproportionate  dilatation or mechanical obstructive process evident on partial imaging.  No ascites.     T-spine: Mild dextroscoliotic  curvature of thoracic spine with sagittal  data sets revealing normal thoracic kyphotic curvature without focal  subluxation or listhesis. Vertebral body heights are preserved apart  from T11 and T12 levels which demonstrate inferior endplate irregularity  T11 and mild height loss along with T12 kyphoplasty repair. No  aggressive bone marrow signal findings of abnormal bone marrow edema  otherwise noted with grossly normal caliber and contour of the thoracic  cord and spinal canal. Paraspinal soft tissues unremarkable for  paraspinal hematoma or acute soft tissue findings in the paraspinal  region. No critical spinal canal stenosis by osseous means and no gross  neuroforaminal stenosis with perineural fat largely well preserved apart  from mild to moderate neuroforaminal stenosis at the right T9-T10 level.     L-spine: Sagittal datasets reveal grossly normal alignment without focal  subluxation or listhesis however multilevel compression deformities with  kyphoplasty repair at L1 and L4 with associated artifact from  kyphoplasty repair. Mild retropulsion greatest at the L1 level where  there is posterior wall retropulsion involvement 6 mm creating at least  mild to moderate spinal canal stenosis at this level along with  intermixed spondylitic changes at the L3-L4 interspace level with mild  to moderate spinal canal stenosis and minimal 3 mm retropulsion L4 level  along with abnormal bone marrow signal and endplate irregularity of the  L3 level consistent with edema from acute or recent compression fracture  deformity with 25-50% height loss however no retropulsion. Facets are  well aligned. No critical spinal canal narrowing and no paraspinal  hematoma. SI joints grossly symmetric on limited imaging.       Impression:      1. Abdomen severely motion degraded demonstrating several areas of  well-defined T2 hyperintense lesions most consistent with small hepatic  cysts otherwise no parenchymal process evident or  steatotic component.     2. Status post cholecystectomy with post cholecystectomy ectasia of  limited evaluation however no obvious stricturing, contour irregularity  or filling defect to suggest choledocholithiasis most consistent with  post cholecystectomy ectasia measuring up to 12 mm in diameter. No  pancreas divisum anatomy.     3. Acute or recent L3 compression fracture deformity involving the  superior endplate with 50% height loss greatest in the midportion  without retropulsion or posterior element involvement and no critical  spinal canal stenosis at this level with multilevel compression  deformities thoracolumbar junction and lumbar spine as detailed above  with prior kyphoplasty repairs at these areas.     D:  09/11/2020  E:  09/11/2020     This report was finalized on 9/12/2020 4:01 PM by Dr. Lincoln Mallory.       MRI Lumbar Spine Without Contrast [[id number]] Collected: 09/11/20 1052     Updated: 09/12/20 1604    Narrative:      EXAMINATION: MRI ABDOMEN WO CONTRAST MRCP-, MRI THORACIC SPINE WO  CONTRAST-, MRI LUMBAR SPINE WO CONTRAST- 09/11/2020     INDICATION: abnormal US, lfts; E87.1-Kegj-jxafknrlml and hyponatremia;  R29.6-Repeated falls; R41.82-Altered mental status, unspecified;  R09.02-Hypoxemia; I10-Essential (primary) hypertension; Z74.09-Other  reduced mobility; Z78.9-Other specified health status     TECHNIQUE: Multiplanar MRI of the abdomen without intravenous contrast  following MRCP protocol, multiplanar MRI of the thoracic spine without  intravenous contrast, multiplanar MRI of the lumbar spine without  intravenous contrast.     COMPARISON: Lumbar spine radiographs 07/28/2020     FINDINGS:      ABDOMEN: Moderate motion degraded examination. Lung bases demonstrate  asymmetric elevation of the right hemidiaphragm with adjacent  atelectasis otherwise lung bases are clear. Liver contains a few  scattered T2 hyperintense well-defined areas of likely hepatic cyst  formation. No significant signal  drop on opposed phase imaging to  suggest steatotic component. Gallbladder is surgically absent. Motion  degraded MRCP sequences however noted common bile duct measuring up to  12 mm with tapering to the level of the ampulla identified of likely  postcholecystectomy ectasia without distinct intrahepatic biliary  dilatation or irregularity evident. No stricturing or shouldering  evident and no filling defect to suggest choledocholithiasis. No  pancreas divisum anatomy with normal branching anatomy of the pancreatic  duct. Pancreas and spleen grossly unremarkable. Adrenals without  distinct nodule. Kidneys without hydronephrosis or hydroureter.  Visualized GI tract grossly unremarkable without disproportionate  dilatation or mechanical obstructive process evident on partial imaging.  No ascites.     T-spine: Mild dextroscoliotic curvature of thoracic spine with sagittal  data sets revealing normal thoracic kyphotic curvature without focal  subluxation or listhesis. Vertebral body heights are preserved apart  from T11 and T12 levels which demonstrate inferior endplate irregularity  T11 and mild height loss along with T12 kyphoplasty repair. No  aggressive bone marrow signal findings of abnormal bone marrow edema  otherwise noted with grossly normal caliber and contour of the thoracic  cord and spinal canal. Paraspinal soft tissues unremarkable for  paraspinal hematoma or acute soft tissue findings in the paraspinal  region. No critical spinal canal stenosis by osseous means and no gross  neuroforaminal stenosis with perineural fat largely well preserved apart  from mild to moderate neuroforaminal stenosis at the right T9-T10 level.     L-spine: Sagittal datasets reveal grossly normal alignment without focal  subluxation or listhesis however multilevel compression deformities with  kyphoplasty repair at L1 and L4 with associated artifact from  kyphoplasty repair. Mild retropulsion greatest at the L1 level where  there  is posterior wall retropulsion involvement 6 mm creating at least  mild to moderate spinal canal stenosis at this level along with  intermixed spondylitic changes at the L3-L4 interspace level with mild  to moderate spinal canal stenosis and minimal 3 mm retropulsion L4 level  along with abnormal bone marrow signal and endplate irregularity of the  L3 level consistent with edema from acute or recent compression fracture  deformity with 25-50% height loss however no retropulsion. Facets are  well aligned. No critical spinal canal narrowing and no paraspinal  hematoma. SI joints grossly symmetric on limited imaging.       Impression:      1. Abdomen severely motion degraded demonstrating several areas of  well-defined T2 hyperintense lesions most consistent with small hepatic  cysts otherwise no parenchymal process evident or steatotic component.     2. Status post cholecystectomy with post cholecystectomy ectasia of  limited evaluation however no obvious stricturing, contour irregularity  or filling defect to suggest choledocholithiasis most consistent with  post cholecystectomy ectasia measuring up to 12 mm in diameter. No  pancreas divisum anatomy.     3. Acute or recent L3 compression fracture deformity involving the  superior endplate with 50% height loss greatest in the midportion  without retropulsion or posterior element involvement and no critical  spinal canal stenosis at this level with multilevel compression  deformities thoracolumbar junction and lumbar spine as detailed above  with prior kyphoplasty repairs at these areas.     D:  09/11/2020  E:  09/11/2020     This report was finalized on 9/12/2020 4:01 PM by Dr. Lincoln Mallory.                  I have reviewed the medications:  Scheduled Meds:amiodarone, 200 mg, Oral, Daily  heparin (porcine), 5,000 Units, Subcutaneous, Q8H  metoprolol tartrate, 50 mg, Oral, BID  pramipexole, 1 mg, Oral, BID  sodium chloride, 10 mL, Intravenous, Q12H      Continuous  Infusions:   PRN Meds:.docusate sodium  •  gabapentin  •  haloperidol  •  haloperidol lactate  •  HYDROcodone-acetaminophen  •  LORazepam  •  sodium chloride  •  sodium chloride    Assessment/Plan   Assessment & Plan     Active Hospital Problems    Diagnosis  POA   • **Acute hyponatremia [E87.1]  Yes   • HTN (hypertension) [I10]  Yes   • HLD (hyperlipidemia) [E78.5]  Unknown   • Depression [F32.9]  Unknown   • A-fib (CMS/HCC) [I48.91]  Unknown   • Fall [W19.XXXA]  Unknown   • Generalized weakness [R53.1]  Unknown   • Lung nodule [R91.1]  Unknown   • Hiatal hernia [K44.9]  Unknown   • Elevated LFTs [R79.89]  Unknown   • HALLIE (acute kidney injury) (CMS/HCC) [N17.9]  Yes   • Acute UTI (urinary tract infection) [N39.0]  Yes   • Hypoxia [R09.02]  Unknown      Resolved Hospital Problems   No resolved problems to display.        Brief Hospital Course to date:  Zhanna Mccabe is a 80 y.o. female with afib on chronic anticoagulation, HTN, HL, IBS, RLS, and nocturnal oxygen use, with recent T/L spine kyphoplasties here with dysuria and AMS     Severe hyponatremia  --resolved  --cymbalta/triamterene held  --likely ADH mechanism, continue fluid restriction  --dc serial lytes, check bmp in am     Encephalopathy  --hospital delirium, coupled with hyponatremia and sleep deprivation  --recent UTI  --better     Hypoxia  Lung nodue  --CT with pleural based nodule, outpatient pulmonary clinic follow up  --on BIPAP/oxygen at night  --monitor     Acute endplate L3 compression deformity  --NSG consulted, likely not acute, pain control, mobilize  --MRI done  --PT/OT     UTI  -- continue ceftriaxone for 5 days, culture negative to date, no symptoms, no fevers, DC soon     Elevated LFTs  --US abnormal, mild elevation in alk phos/angel  --MRCP - Status post cholecystectomy with post cholecystectomy ectasia of  limited evaluation however no obvious stricturing, contour irregularity  or filling defect to suggest choledocholithiasis most  consistent with  post cholecystectomy ectasia measuring up to 12 mm in diameter. No  pancreas divisum anatomy     Afib  --xarelto held, on BB/amio  --rate stable, resume xarelto pending MRCP results, need for procedure determined  --restart xarelto at dc     HTN  --hold maxzide     Depression  --hold cymbalta    DVT Prophylaxis: heparin    Disposition: I expect the patient to be discharged in the next day or so, plan for [facility]     CODE STATUS:   Code Status and Medical Interventions:   Ordered at: 09/08/20 2035     Level Of Support Discussed With:    Patient     Code Status:    CPR     Medical Interventions (Level of Support Prior to Arrest):    Full         Electronically signed by Shahla Barraza DO, 09/13/20, 11:54 EDT.

## 2020-09-13 NOTE — THERAPY TREATMENT NOTE
Patient Name: Zhanna Mccabe  : 1940    MRN: [id number]                              Today's Date: 2020       Admit Date: 2020    Visit Dx:     ICD-10-CM ICD-9-CM   1. Acute hyponatremia  E87.1 276.1   2. Frequent falls  R29.6 V15.88   3. Acute on chronic alteration in mental status  R41.82 780.09   4. Hypoxemia  R09.02 799.02   5. Elevated blood pressure reading with diagnosis of hypertension  I10 401.9   6. Impaired mobility and ADLs  Z74.09 V49.89    Z78.9      Patient Active Problem List   Diagnosis   • Acute hyponatremia   • HTN (hypertension)   • HLD (hyperlipidemia)   • Depression   • A-fib (CMS/HCC)   • Fall   • Generalized weakness   • Lung nodule   • Hiatal hernia   • Elevated LFTs   • HALLIE (acute kidney injury) (CMS/HCC)   • Acute UTI (urinary tract infection)   • Hypoxia     Past Medical History:   Diagnosis Date   • A-fib (CMS/HCC)    • Breast cancer (CMS/HCC)     left breast   • Depression    • High cholesterol    • Hypertension    • IBS (irritable bowel syndrome)    • Restless leg      Past Surgical History:   Procedure Laterality Date   • ARM SKIN LESION BIOPSY / EXCISION      melanoma   • BLADDER REPAIR      prolapse   • BREAST BIOPSY     • CHOLECYSTECTOMY     • HAND SURGERY Right     4th & 5th fingers   • HYSTERECTOMY     • HYSTERECTOMY     • KNEE SURGERY Left     ORIF   • MASTECTOMY Left    • MASTECTOMY       General Information     Row Name 20 138          Physical Therapy Time and Intention    Document Type  therapy note (daily note)  -LM     Mode of Treatment  individual therapy;physical therapy  -     Row Name 20 1683          General Information    Patient Profile Reviewed  yes  -LM     Existing Precautions/Restrictions  fall;other (see comments) High anxiety re: falling  -     Row Name 20          Cognition    Orientation Status (Cognition)  oriented to;person;place  -     Row Name 20 5935          Safety Issues, Functional  Mobility    Safety Issues Affecting Function (Mobility)  safety precaution awareness;safety precautions follow-through/compliance;insight into deficits/self-awareness;judgment;sequencing abilities  -LM     Impairments Affecting Function (Mobility)  balance;endurance/activity tolerance;postural/trunk control;strength;coordination  -LM       User Key  (r) = Recorded By, (t) = Taken By, (c) = Cosigned By    Initials Name Provider Type    LM Guerita Whyte, PT Physical Therapist        Mobility     Row Name 09/13/20 1337          Bed Mobility    Bed Mobility  supine-sit;scooting/bridging  -LM     Scooting/Bridging [place] (Bed Mobility)  standby assist;verbal cues  -LM     Supine-Sit [place] (Bed Mobility)  minimum assist (75% patient effort);1 person assist  -LM     Assistive Device (Bed Mobility)  bed rails;head of bed elevated  -LM     Comment (Bed Mobility)  Vc's for sequencing.  Pt able to scoot hips forward to EOB with increased time/effort.  -LM     Row Name 09/13/20 1330          Transfers    Comment (Transfers)  Stood x 3.  Pt with high anxiety re: fear of falling.  PT educated pt on staying inside the walker when standing vs. holding it too far out in front.  Pt demonstrates a posterior lean - educated on shifting weight forward.  -LM     Row Name 09/13/20 1337          Sit-Stand Transfer    Sit-Stand [place] (Transfers)  minimum assist (75% patient effort);1 person assist  -LM     Assistive Device (Sit-Stand Transfers)  walker, front-wheeled  -LM     Row Name 09/13/20 1334          Gait/Stairs (Locomotion)    [place] Level (Gait)  moderate assist (50% patient effort);1 person assist + chair following for safety  -LM     Assistive Device (Gait)  walker, front-wheeled  -LM     Distance in Feet (Gait)  6' + 12'  -LM     Deviations/Abnormal Patterns (Gait)  base of support, narrow;nicky decreased;stride length decreased  -LM     Bilateral Gait Deviations  forward flexed posture;heel strike  decreased  -LM     Comment (Gait/Stairs)  Vc's for upright posture, to stay inside walker, and to increase step length.  Chair followed closely for safety.  -LM       User Key  (r) = Recorded By, (t) = Taken By, (c) = Cosigned By    Initials Name Provider Type    Guerita Acevedo PT Physical Therapist        Obj/Interventions     Row Name 09/13/20 1337          Motor Skills    Therapeutic Exercise  hip;knee;ankle  -LM     Row Name 09/13/20 1337          Hip (Therapeutic Exercise)    Hip (Therapeutic Exercise)  strengthening exercise;AROM (active range of motion)  -LM     Hip Strengthening (Therapeutic Exercise)  aBduction;aDduction;supine;other (see comments);bilateral x15 reps  -LM     Row Name 09/13/20 1337          Knee (Therapeutic Exercise)    Knee (Therapeutic Exercise)  AROM (active range of motion)  -LM     Knee AROM (Therapeutic Exercise)  bilateral;SLR (straight leg raise);heel slides;other (see comments) x15 reps  -LM     Row Name 09/13/20 1337          Ankle (Therapeutic Exercise)    Ankle (Therapeutic Exercise)  AROM (active range of motion);strengthening exercise  -LM     Ankle AROM (Therapeutic Exercise)  bilateral;dorsiflexion;plantarflexion;other (see comments) x15 reps  -LM       User Key  (r) = Recorded By, (t) = Taken By, (c) = Cosigned By    Initials Name Provider Type    Guerita Acevedo PT Physical Therapist        Goals/Plan    No documentation.       Clinical Impression     Row Name 09/13/20 5677          Pain    Additional Documentation  Pain Scale: Numbers Pre/Post-Treatment (Group)  -LM     [facility] Name 09/13/20 5418          Pain Scale: Numbers Pre/Post-Treatment    Pretreatment Pain Rating  0/10 - no pain  -LM     Posttreatment Pain Rating  3/10  -LM     Pain Location - Orientation  lower  -LM     Pain Location  back  -LM     Pain Intervention(s)  Repositioned;Ambulation/increased activity  -LM     Row Name 09/13/20 1950          Plan of Care Review    Plan of Care Reviewed With  patient   -LM     Outcome Summary  Pt progressing slowly towards skilled PT goals.  Pt has a significant fear of falling and requires encouragement/education re: mobility.  Pt transferred supine-->sit with MinAx1, stood x 3 with MinAx1, and ambulated 6' + 12' using rw with ModAx1 + chair following closely for safety.  BLE ther ex completed without complaint.  Continue with skilled PT to improve mobility and safety.  -LM     Row Name 09/13/20 1338          Vital Signs    Pre Systolic BP Rehab  122  -LM     Pre Treatment Diastolic BP  56  -LM     Pretreatment Heart Rate (beats/min)  59  -LM     Posttreatment Heart Rate (beats/min)  58  -LM     Pre SpO2 (%)  93  -LM     O2 Delivery Pre Treatment  room air  -LM     Post SpO2 (%)  91  -LM     O2 Delivery Post Treatment  room air  -LM     Pre Patient Position  Supine  -LM     Post Patient Position  Sitting  -LM     Row Name 09/13/20 7975          Positioning and Restraints    Pre-Treatment Position  in bed  -LM     Post Treatment Position  chair  -LM     In Chair  reclined;call light within reach;encouraged to call for assist;waffle cushion;notified ns  -LM       User Key  (r) = Recorded By, (t) = Taken By, (c) = Cosigned By    Initials Name Provider Type    LM Guerita Whyte, PT Physical Therapist        Outcome Measures     Row Name 09/13/20 7229          How much help from another person do you currently need...    Turning from your back to your side while in flat bed without using bedrails?  3  -LM     Moving from lying on back to sitting on the side of a flat bed without bedrails?  3  -LM     Moving to and from a bed to a chair (including a wheelchair)?  2  -LM     Standing up from a chair using your arms (e.g., wheelchair, bedside chair)?  3  -LM     Climbing 3-5 steps with a railing?  2  -LM     To walk in hospital room?  2  -LM     AM-PAC 6 Clicks Score (PT)  15  -LM     Row Name 09/13/20 7769          Functional Assessment    Outcome Measure Options  AM-PAC 6 Clicks Basic  Mobility (PT)  -LM       User Key  (r) = Recorded By, (t) = Taken By, (c) = Cosigned By    Initials Name Provider Type    LM Guerita Whyte PT Physical Therapist        Physical Therapy Education                 Title: PT OT SLP Therapies (In Progress)     Topic: Physical Therapy (In Progress)     Point: Mobility training (In Progress)     Learning Progress Summary           Patient Acceptance, E, NR by KG at 9/9/2020 1425                   Point: Home exercise program (Not Started)     Learner Progress:  Not documented in this visit.          Point: Body mechanics (In Progress)     Learning Progress Summary           Patient Acceptance, E, NR by KG at 9/9/2020 1425                   Point: Precautions (In Progress)     Learning Progress Summary           Patient Acceptance, E, NR by KG at 9/9/2020 1425                               User Key     Initials Effective Dates Name Provider Type Discipline    KG 05/22/20 -  Sulema Bethea PT Physical Therapist PT              PT Recommendation and Plan     Plan of Care Reviewed With: patient  Outcome Summary: Pt progressing slowly towards skilled PT goals.  Pt has a significant fear of falling and requires encouragement/education re: mobility.  Pt transferred supine-->sit with MinAx1, stood x 3 with MinAx1, and ambulated 6' + 12' using rw with ModAx1 + chair following closely for safety.  BLE ther ex completed without complaint.  Continue with skilled PT to improve mobility and safety.     Time Calculation:   PT Charges     Row Name 09/13/20 1337             Time Calculation    Start Time  1337  -LM      PT Received On  09/13/20  -LM      PT Goal Re-Cert Due Date  09/19/20  -LM         Timed Charges    74418 - PT Therapeutic Exercise Minutes  10  -LM      23593 - Gait Training Minutes   20  -LM      98997 - PT Therapeutic Activity Minutes  8  -LM        User Key  (r) = Recorded By, (t) = Taken By, (c) = Cosigned By    Initials Name Provider Type    YANELI Whyte  DIPESH Dexter Physical Therapist        Therapy Charges for Today     Code Description Service Date Service Provider Modifiers Qty    58640896382 HC GAIT TRAINING EA 15 MIN 9/13/2020 Guerita Whyte, PT GP 1    90379603435 HC PT THERAPEUTIC ACT EA 15 MIN 9/13/2020 Guerita Whyte, PT GP 1    48001679958 HC PT THER PROC EA 15 MIN 9/13/2020 Guerita Whyte, PT GP 1          PT G-Codes  Outcome Measure Options: AM-PAC 6 Clicks Basic Mobility (PT)  AM-PAC 6 Clicks Score (PT): 15  AM-PAC 6 Clicks Score (OT): 16    Guerita Whyte PT  9/13/2020

## 2020-09-13 NOTE — PLAN OF CARE
Problem: Fall Injury Risk  Goal: Absence of Fall and Fall-Related Injury  Outcome: Ongoing, Progressing     Problem: Pain, Chronic (Adult)  Intervention: Develop Pain Management Plan  Recent Flowsheet Documentation  Taken 9/12/2020 2000 by Eitan Carrillo RN  Pain Management Interventions: see MAR     Problem: Patient Care Overview  Goal: Plan of Care Review  Flowsheets (Taken 9/13/2020 0617)  Progress: improving  Plan of Care Reviewed With: patient  Outcome Summary: VSS, pt c/o bilateral lower extremity pain, rt leg is the worst, pain medication given prn, pt alert oriented x4, pt sleeping well throughout the night. will continue to monitor.     Problem: Pain Chronic (Persistent) (Comorbidity Management)  Goal: Acceptable Pain Control and Functional Ability  Intervention: Develop Pain Management Plan  Recent Flowsheet Documentation  Taken 9/12/2020 2000 by Eitan Carrillo RN  Pain Management Interventions: see MAR   Goal Outcome Evaluation:  Plan of Care Reviewed With: patient  Progress: improving  Outcome Summary: VSS, pt c/o bilateral lower extremity pain, rt leg is the worst, pain medication given prn, pt alert oriented x4, pt sleeping well throughout the night. will continue to monitor.

## 2020-09-13 NOTE — PLAN OF CARE
Goal Outcome Evaluation:  Plan of Care Reviewed With: patient  Progress: improving    Problem: Fall Risk (Adult)  Goal: Absence of Fall  Outcome: Ongoing, Progressing  Intervention: Monitor/Assist with Self Care  Recent Flowsheet Documentation  Taken 9/13/2020 1400 by Yesenia Hawthorne RN  Activity Assistance Provided: assistance, 1 person  Taken 9/13/2020 1200 by Yesenia Hawthorne RN  Activity Assistance Provided: assistance, 1 person  Taken 9/13/2020 0956 by Yesenia Hawthorne RN  Activity Assistance Provided: assistance, 1 person  Taken 9/13/2020 0800 by Yesenia Hawthorne, RN  Activity Assistance Provided: assistance, 1 person  Intervention: Promote Injury-Free Environment  Recent Flowsheet Documentation  Taken 9/13/2020 1400 by Yesenia Hawthorne RN  Safety Promotion/Fall Prevention:   activity supervised   fall prevention program maintained   nonskid shoes/slippers when out of bed   room organization consistent   safety round/check completed  Taken 9/13/2020 1200 by Yesenia Hawthorne RN  Safety Promotion/Fall Prevention:   activity supervised   fall prevention program maintained   nonskid shoes/slippers when out of bed   safety round/check completed  Taken 9/13/2020 0956 by Yesenia Hawthorne RN  Safety Promotion/Fall Prevention:   activity supervised   fall prevention program maintained   nonskid shoes/slippers when out of bed   room organization consistent   safety round/check completed  Taken 9/13/2020 0800 by Yesenia Hawthorne RN  Safety Promotion/Fall Prevention:   activity supervised   clutter free environment maintained   fall prevention program maintained   nonskid shoes/slippers when out of bed   safety round/check completed  Intervention: Identify and Manage Contributors to Fall Injury Risk  Recent Flowsheet Documentation  Taken 9/13/2020 0800 by Yesenia Hawthorne, RN  Medication Review/Management: medications reviewed

## 2020-09-14 LAB
ALBUMIN SERPL-MCNC: 3.3 G/DL (ref 3.5–5.2)
ANION GAP SERPL CALCULATED.3IONS-SCNC: 10 MMOL/L (ref 5–15)
BASOPHILS # BLD AUTO: 0.04 10*3/MM3 (ref 0–0.2)
BASOPHILS NFR BLD AUTO: 0.9 % (ref 0–1.5)
BUN SERPL-MCNC: 29 MG/DL (ref 8–23)
BUN/CREAT SERPL: 34.9 (ref 7–25)
CALCIUM SPEC-SCNC: 8.9 MG/DL (ref 8.6–10.5)
CHLORIDE SERPL-SCNC: 101 MMOL/L (ref 98–107)
CO2 SERPL-SCNC: 25 MMOL/L (ref 22–29)
CREAT SERPL-MCNC: 0.83 MG/DL (ref 0.57–1)
DEPRECATED RDW RBC AUTO: 52.9 FL (ref 37–54)
EOSINOPHIL # BLD AUTO: 0.12 10*3/MM3 (ref 0–0.4)
EOSINOPHIL NFR BLD AUTO: 2.6 % (ref 0.3–6.2)
ERYTHROCYTE [DISTWIDTH] IN BLOOD BY AUTOMATED COUNT: 16.8 % (ref 12.3–15.4)
GFR SERPL CREATININE-BSD FRML MDRD: 66 ML/MIN/1.73
GLUCOSE SERPL-MCNC: 107 MG/DL (ref 65–99)
HCT VFR BLD AUTO: 35.4 % (ref 34–46.6)
HGB BLD-MCNC: 10.6 G/DL (ref 12–15.9)
IMM GRANULOCYTES # BLD AUTO: 0.06 10*3/MM3 (ref 0–0.05)
IMM GRANULOCYTES NFR BLD AUTO: 1.3 % (ref 0–0.5)
LYMPHOCYTES # BLD AUTO: 1.04 10*3/MM3 (ref 0.7–3.1)
LYMPHOCYTES NFR BLD AUTO: 22.3 % (ref 19.6–45.3)
MCH RBC QN AUTO: 26 PG (ref 26.6–33)
MCHC RBC AUTO-ENTMCNC: 29.9 G/DL (ref 31.5–35.7)
MCV RBC AUTO: 86.8 FL (ref 79–97)
MONOCYTES # BLD AUTO: 0.69 10*3/MM3 (ref 0.1–0.9)
MONOCYTES NFR BLD AUTO: 14.8 % (ref 5–12)
NEUTROPHILS NFR BLD AUTO: 2.71 10*3/MM3 (ref 1.7–7)
NEUTROPHILS NFR BLD AUTO: 58.1 % (ref 42.7–76)
NRBC BLD AUTO-RTO: 0 /100 WBC (ref 0–0.2)
PHOSPHATE SERPL-MCNC: 2.9 MG/DL (ref 2.5–4.5)
PLATELET # BLD AUTO: 288 10*3/MM3 (ref 140–450)
PMV BLD AUTO: 9.4 FL (ref 6–12)
POTASSIUM SERPL-SCNC: 4.2 MMOL/L (ref 3.5–5.2)
RBC # BLD AUTO: 4.08 10*6/MM3 (ref 3.77–5.28)
SODIUM SERPL-SCNC: 136 MMOL/L (ref 136–145)
WBC # BLD AUTO: 4.66 10*3/MM3 (ref 3.4–10.8)

## 2020-09-14 PROCEDURE — 25010000002 LORAZEPAM PER 2 MG: Performed by: HOSPITALIST

## 2020-09-14 PROCEDURE — 80069 RENAL FUNCTION PANEL: CPT | Performed by: INTERNAL MEDICINE

## 2020-09-14 PROCEDURE — 97110 THERAPEUTIC EXERCISES: CPT

## 2020-09-14 PROCEDURE — 99232 SBSQ HOSP IP/OBS MODERATE 35: CPT | Performed by: FAMILY MEDICINE

## 2020-09-14 PROCEDURE — U0004 COV-19 TEST NON-CDC HGH THRU: HCPCS | Performed by: FAMILY MEDICINE

## 2020-09-14 PROCEDURE — 25010000002 HEPARIN (PORCINE) PER 1000 UNITS: Performed by: HOSPITALIST

## 2020-09-14 PROCEDURE — 85025 COMPLETE CBC W/AUTO DIFF WBC: CPT | Performed by: INTERNAL MEDICINE

## 2020-09-14 RX ORDER — BISACODYL 10 MG
10 SUPPOSITORY, RECTAL RECTAL DAILY PRN
Status: DISCONTINUED | OUTPATIENT
Start: 2020-09-14 | End: 2020-09-15 | Stop reason: HOSPADM

## 2020-09-14 RX ORDER — BISACODYL 5 MG/1
10 TABLET, DELAYED RELEASE ORAL DAILY PRN
Status: DISCONTINUED | OUTPATIENT
Start: 2020-09-14 | End: 2020-09-15 | Stop reason: HOSPADM

## 2020-09-14 RX ADMIN — AMIODARONE HYDROCHLORIDE 200 MG: 200 TABLET ORAL at 08:18

## 2020-09-14 RX ADMIN — PRAMIPEXOLE DIHYDROCHLORIDE 1 MG: 1 TABLET ORAL at 08:18

## 2020-09-14 RX ADMIN — SODIUM CHLORIDE, PRESERVATIVE FREE 10 ML: 5 INJECTION INTRAVENOUS at 20:33

## 2020-09-14 RX ADMIN — LORAZEPAM 1 MG: 2 INJECTION INTRAMUSCULAR; INTRAVENOUS at 01:18

## 2020-09-14 RX ADMIN — SODIUM CHLORIDE, PRESERVATIVE FREE 10 ML: 5 INJECTION INTRAVENOUS at 08:19

## 2020-09-14 RX ADMIN — GABAPENTIN 100 MG: 100 CAPSULE ORAL at 01:14

## 2020-09-14 RX ADMIN — PRAMIPEXOLE DIHYDROCHLORIDE 1.5 MG: 1 TABLET ORAL at 20:33

## 2020-09-14 RX ADMIN — METOPROLOL TARTRATE 50 MG: 50 TABLET, FILM COATED ORAL at 08:18

## 2020-09-14 RX ADMIN — HEPARIN SODIUM 5000 UNITS: 5000 INJECTION INTRAVENOUS; SUBCUTANEOUS at 06:53

## 2020-09-14 RX ADMIN — HYDROCODONE BITARTRATE AND ACETAMINOPHEN 1 TABLET: 7.5; 325 TABLET ORAL at 20:34

## 2020-09-14 RX ADMIN — RIVAROXABAN 20 MG: 20 TABLET, FILM COATED ORAL at 16:57

## 2020-09-14 RX ADMIN — HYDROCODONE BITARTRATE AND ACETAMINOPHEN 1 TABLET: 7.5; 325 TABLET ORAL at 02:27

## 2020-09-14 NOTE — PLAN OF CARE
Goal Outcome Evaluation:  Plan of Care Reviewed With: patient  Progress: no change    Problem: Fall Risk (Adult)  Goal: Absence of Fall  Outcome: Ongoing, Progressing  Intervention: Monitor/Assist with Self Care  Recent Flowsheet Documentation  Taken 9/14/2020 1350 by Yesenia Hawthorne, RN  Activity Assistance Provided: assistance, 1 person  Taken 9/14/2020 1200 by Yesenia Hawthorne RN  Activity Assistance Provided: assistance, 1 person  Taken 9/14/2020 1000 by Yesenia Hawthorne RN  Activity Assistance Provided: assistance, 1 person  Taken 9/14/2020 0800 by Yesenia Hawthorne, RN  Activity Assistance Provided: assistance, 1 person  Intervention: Promote Injury-Free Environment  Recent Flowsheet Documentation  Taken 9/14/2020 1350 by Yesenia Hawthorne RN  Safety Promotion/Fall Prevention:   activity supervised   fall prevention program maintained   nonskid shoes/slippers when out of bed   room organization consistent   safety round/check completed  Taken 9/14/2020 1200 by Yesenia Hawthorne RN  Safety Promotion/Fall Prevention:   activity supervised   fall prevention program maintained   nonskid shoes/slippers when out of bed   room organization consistent   safety round/check completed  Taken 9/14/2020 1000 by Yesenia Hawthorne RN  Safety Promotion/Fall Prevention:   activity supervised   fall prevention program maintained   nonskid shoes/slippers when out of bed   safety round/check completed   room organization consistent  Taken 9/14/2020 0800 by Yesenia Hawthorne RN  Safety Promotion/Fall Prevention:   activity supervised   fall prevention program maintained   nonskid shoes/slippers when out of bed   room organization consistent   safety round/check completed  Intervention: Identify and Manage Contributors to Fall Injury Risk  Recent Flowsheet Documentation  Taken 9/14/2020 0800 by Yesenia Hawthorne RN  Medication Review/Management: medications reviewed

## 2020-09-14 NOTE — PROGRESS NOTES
[facility] Medicine Services  PROGRESS NOTE    Patient Name: Zhanna Mccabe  : 1940  MRN: [id number]    Date of Admission: 2020  Primary Care Physician: Mackenzie Whitten MD    Subjective   Subjective     CC:  F/U constipation     HPI:  Pt seen and examined. RN notes reviewed. No acute events overnight but patient states she has bad RLS and her legs bothered her all night. Also has still not had a bowel movement.     Review of Systems  Gen- No fevers, chills  CV- No chest pain, palpitations  Resp- No cough, dyspnea  GI- No N/V/D, abd pain, +constipation    Objective   Objective     Vital Signs:   Temp:  [97.5 °F (36.4 °C)-98.1 °F (36.7 °C)] 97.5 °F (36.4 °C)  Heart Rate:  [54-62] 54  Resp:  [16-18] 16  BP: (126-152)/(47-93) 126/53        Physical Exam:  Constitutional: No acute distress, awake, alert  HENT: NCAT, mucous membranes moist  Respiratory: Clear to auscultation bilaterally, respiratory effort normal   Cardiovascular: RRR, no murmurs  Gastrointestinal: Positive bowel sounds, soft, nontender, nondistended  Musculoskeletal: No edema  Psychiatric: Appropriate affect, cooperative  Neurologic: Oriented to self, speech clear  Skin: No rashes    Results Reviewed:  Results from last 7 days   Lab Units 20  0543 09/10/20  0611  20  1438   WBC 10*3/mm3 4.66 5.30 9.41   < > 8.59   HEMOGLOBIN g/dL 10.6* 10.5* 11.9*   < > 12.0   HEMATOCRIT % 35.4 35.2 37.2   < > 37.4   PLATELETS 10*3/mm3 288 289 289   < > 250   PROCALCITONIN ng/mL  --   --   --   --  0.17    < > = values in this interval not displayed.     Results from last 7 days   Lab Units 20  0405 20  0543 20  2317  09/10/20  0611  20  0412  20  1438   SODIUM mmol/L 136 136 136   < > 127*   < > 120*   < > 116*   POTASSIUM mmol/L 4.2 3.9  --   --  3.8  --  4.2  --  4.6   CHLORIDE mmol/L 101 101  --   --  91*  --  86*  --  81*   CO2 mmol/L 25.0 26.0  --   --  23.0  --  24.0   --  23.0   BUN mg/dL 29* 33*  --   --  23  --  27*  --  28*   CREATININE mg/dL 0.83 0.95  --   --  0.96  --  0.93  --  1.12*   GLUCOSE mg/dL 107* 120*  --   --  139*  --  104*  --  113*   CALCIUM mg/dL 8.9 8.8  --   --  9.5  --  8.9  --  9.8   ALT (SGPT) U/L  --   --   --   --   --   --  111*  --  114*   AST (SGOT) U/L  --   --   --   --   --   --  107*  --  121*   TROPONIN T ng/mL  --   --   --   --   --   --   --   --  <0.010   PROBNP pg/mL  --   --   --   --   --   --   --   --  1,159.0    < > = values in this interval not displayed.     Estimated Creatinine Clearance: 51.1 mL/min (by C-G formula based on SCr of 0.83 mg/dL).    Microbiology Results Abnormal     Procedure Component Value - Date/Time    Urine Culture - Urine, Urine, Catheter [[id number]]  (Normal) Collected: 09/08/20 1650    Lab Status: Final result Specimen: Urine, Catheter Updated: 09/09/20 2207     Urine Culture No growth    COVID-19,[facility], NP SWAB IN [facility] VIRAL TRANSPORT MEDIA 24-30 HR TAT - Swab, Nasopharynx [[id number]] Collected: 09/08/20 1818    Lab Status: Final result Specimen: Swab from Nasopharynx Updated: 09/09/20 1501     SARS-CoV-2 ALAN Not Detected          Imaging Results (Last 24 Hours)     ** No results found for the last 24 hours. **               I have reviewed the medications:  Scheduled Meds:amiodarone, 200 mg, Oral, Daily  heparin (porcine), 5,000 Units, Subcutaneous, Q8H  metoprolol tartrate, 50 mg, Oral, BID  pramipexole, 1.5 mg, Oral, BID  sodium chloride, 10 mL, Intravenous, Q12H      Continuous Infusions:   PRN Meds:.bisacodyl  •  bisacodyl  •  docusate sodium  •  gabapentin  •  haloperidol  •  haloperidol lactate  •  HYDROcodone-acetaminophen  •  LORazepam  •  magnesium hydroxide  •  melatonin  •  sodium chloride  •  sodium chloride    Assessment/Plan   Assessment & Plan     Active Hospital Problems    Diagnosis  POA   • **Acute hyponatremia [E87.1]  Yes   • HTN (hypertension) [I10]  Yes   • HLD (hyperlipidemia)  [E78.5]  Unknown   • Depression [F32.9]  Unknown   • A-fib (CMS/HCC) [I48.91]  Unknown   • Fall [W19.XXXA]  Unknown   • Generalized weakness [R53.1]  Unknown   • Lung nodule [R91.1]  Unknown   • Hiatal hernia [K44.9]  Unknown   • Elevated LFTs [R79.89]  Unknown   • HALLIE (acute kidney injury) (CMS/HCC) [N17.9]  Yes   • Acute UTI (urinary tract infection) [N39.0]  Yes   • Hypoxia [R09.02]  Unknown      Resolved Hospital Problems   No resolved problems to display.        Brief Hospital Course to date:  Zhanna Mccabe is a 80 y.o. female with afib on chronic anticoagulation, HTN, HL, IBS, RLS, and nocturnal oxygen use, with recent T/L spine kyphoplasties here with dysuria and AMS     Severe hyponatremia on admission  --resolved  --cymbalta/triamterene held  --likely ADH mechanism, continue fluid restriction     Encephalopathy: RESOLVED  --hospital delirium, coupled with hyponatremia and sleep deprivation  --recent UTI     Hypoxia  Lung nodue  --CT with pleural based nodule, will need outpatient pulmonary clinic follow up  --on BIPAP/oxygen at night     Acute endplate L3 compression deformity  --NSG consulted, likely not acute, pain control, mobilize. No need for surgical intervention.   --MRI reviewed  --PT/OT     UTI  --Completed course of Rocephin      Elevated LFTs  --US abnormal with mild dilatation of the CBD at 1.5cm  --MRCP - Status post cholecystectomy with post cholecystectomy ectasia of limited evaluation however no obvious stricturing, contour irregularity  or filling defect to suggest choledocholithiasis most consistent with post cholecystectomy ectasia measuring up to 12 mm in diameter. No pancreas divisum anatomy.     Afib  --xarelto held on admision, on BB/amio  --restart xarelto today     HTN  --hold maxzide      Depression  --hold cymbalta    Constipation  -Increase bowel regimen today    RLS  -Increase Mirapex to 1.5mg BID    DVT Prophylaxis: heparin    Disposition: I expect the patient to be  discharged tomorrow to [facility], will repeat COVID test.     CODE STATUS:   Code Status and Medical Interventions:   Ordered at: 09/08/20 2035     Level Of Support Discussed With:    Patient     Code Status:    CPR     Medical Interventions (Level of Support Prior to Arrest):    Full         Electronically signed by Randi Christensen DO, 09/14/20, 12:53 EDT.

## 2020-09-14 NOTE — PROGRESS NOTES
Continued Stay Note  [facility]     Patient Name: Zhanna Mccabe  MRN: [id number]  Today's Date: 9/14/2020    Admit Date: 9/8/2020    Discharge Plan     Row Name 09/14/20 1137       Plan    Plan  Return to [facility]    Plan Comments  QUENTIN met with patient at bedside to discuss discharge planning. Plan is home to the [place] with daughter transporting. Spoke with Amie from the [place] to inquire about patients return. Amie explained she is going to meet with the patient today. Pending Covid screening for placement. Denies any other discharge needs at this time.        Discharge Codes    No documentation.             AUDREY Dangelo (Kay)

## 2020-09-14 NOTE — THERAPY TREATMENT NOTE
Patient Name: Zhanna Mccabe  : 1940    MRN: [id number]                              Today's Date: 2020       Admit Date: 2020    Visit Dx:     ICD-10-CM ICD-9-CM   1. Acute hyponatremia  E87.1 276.1   2. Frequent falls  R29.6 V15.88   3. Acute on chronic alteration in mental status  R41.82 780.09   4. Hypoxemia  R09.02 799.02   5. Elevated blood pressure reading with diagnosis of hypertension  I10 401.9   6. Impaired mobility and ADLs  Z74.09 V49.89    Z78.9      Patient Active Problem List   Diagnosis   • Acute hyponatremia   • HTN (hypertension)   • HLD (hyperlipidemia)   • Depression   • A-fib (CMS/HCC)   • Fall   • Generalized weakness   • Lung nodule   • Hiatal hernia   • Elevated LFTs   • HALLIE (acute kidney injury) (CMS/HCC)   • Acute UTI (urinary tract infection)   • Hypoxia     Past Medical History:   Diagnosis Date   • A-fib (CMS/HCC)    • Breast cancer (CMS/HCC)     left breast   • Depression    • High cholesterol    • Hypertension    • IBS (irritable bowel syndrome)    • Restless leg      Past Surgical History:   Procedure Laterality Date   • ARM SKIN LESION BIOPSY / EXCISION      melanoma   • BLADDER REPAIR      prolapse   • BREAST BIOPSY     • CHOLECYSTECTOMY     • HAND SURGERY Right     4th & 5th fingers   • HYSTERECTOMY     • HYSTERECTOMY     • KNEE SURGERY Left     ORIF   • MASTECTOMY Left    • MASTECTOMY       General Information     Row Name 20 1448          OT Time and Intention    Mode of Treatment  individual therapy  -AC     Row Name 20 1448          General Information    Patient Profile Reviewed  yes  -AC     Existing Precautions/Restrictions  (S) fall fear of falling  -AC     Row Name 20 1448          Cognition    Orientation Status (Cognition)  oriented x 3  -AC     Row Name 20 1448          Safety Issues, Functional Mobility    Safety Issues Affecting Function (Mobility)  safety precaution awareness;safety precautions  follow-through/compliance;insight into deficits/self-awareness  -     Impairments Affecting Function (Mobility)  balance;endurance/activity tolerance;strength;postural/trunk control  -       User Key  (r) = Recorded By, (t) = Taken By, (c) = Cosigned By    Initials Name Provider Type    AC Nadia Conklin, OT Occupational Therapist        Mobility/ADL's     Row Name 09/14/20 1518          Bed Mobility    Bed Mobility  supine-sit;sit-supine  -     Supine-Sit [place] (Bed Mobility)  minimum assist (75% patient effort)  -     Sit-Supine [place] (Bed Mobility)  minimum assist (75% patient effort) assist with legs  -     Assistive Device (Bed Mobility)  bed rails;head of bed elevated  -     Row Name 09/14/20 1518          Transfers    Transfers  sit-stand transfer  -     Comment (Transfers)  pt performed 2 STS with RW -  Mod A 1st stand with post lean,  min A 2nd stand  -     Sit-Stand [place] (Transfers)  moderate assist (50% patient effort)  -     Row Name 09/14/20 1518          Sit-Stand Transfer    Assistive Device (Sit-Stand Transfers)  walker, front-wheeled  -     Row Name 09/14/20 1518          Functional Mobility    Functional Mobility- Device  rolling walker  -     Functional Mobility-Distance (Feet)  8  -     Functional Mobility- Safety Issues  step length decreased;loses balance backward  -     Functional Mobility- Comment  4 side steps, 2 steps forward and 2 steps back  -     Row Name 09/14/20 1518          Activities of Daily Living    BADL Assessment/Intervention  lower body dressing  -     Row Name 09/14/20 1518          Lower Body Dressing Assessment/Training    [place] Level (Lower Body Dressing)  doff;don;socks;contact guard assist  -     Position (Lower Body Dressing)  edge of bed sitting  -       User Key  (r) = Recorded By, (t) = Taken By, (c) = Cosigned By    Initials Name Provider Type    AC Nadia Conklin, OT Occupational Therapist         Obj/Interventions     [facility] Name 09/14/20 1522          Shoulder (Therapeutic Exercise)    Shoulder (Therapeutic Exercise)  AROM (active range of motion)  -     Shoulder AROM (Therapeutic Exercise)  bilateral;flexion;horizontal aBduction/aDduction 15 reps  -[facility] 09/14/20 1522          Elbow/Forearm (Therapeutic Exercise)    Elbow/Forearm (Therapeutic Exercise)  AROM (active range of motion)  -     Elbow/Forearm AROM (Therapeutic Exercise)  bilateral;flexion;extension 15reps  -AC     Row Name 09/14/20 1522          Balance    Balance Assessment  sitting static balance  -     Static Sitting Balance  sitting, edge of bed  -     Static Standing Balance  mild impairment  -AC     Row Name 09/14/20 1522          Therapeutic Exercise    Therapeutic Exercise  shoulder;elbow/forearm  -       User Key  (r) = Recorded By, (t) = Taken By, (c) = Cosigned By    Initials Name Provider Type    AC Nadia Conklin, OT Occupational Therapist        Goals/Plan    No documentation.       Clinical Impression     Row Name 09/14/20 1448          Pain Assessment    Additional Documentation  Pain Scale: Numbers Pre/Post-Treatment (Group)  -AC     Row Name 09/14/20 1448          Pain Scale: Numbers Pre/Post-Treatment    Pretreatment Pain Rating  2/10  -     Posttreatment Pain Rating  2/10  -     Pain Location - Side  Bilateral  -AC     Pain Location - Orientation  lower  -     Pain Location  extremity  -AC     Row Name 09/14/20 1448          Plan of Care Review    Plan of Care Reviewed With  patient  -     Progress  improving  -     Outcome Summary  Pt required min A supine to sit ,  CGA to don/doff socks,  mod A STS first time and min A 2nd time,  min A to take 4 sidesteps toward HOB and to take 2 steps forward and 2 back with RW.  Pt with good effort and tolerance to complete 15 reps BUE TE.  Pt is progressing, but limited by weakness and fear of falling.  -       User Key  (r) = Recorded By, (t) = Taken By,  (c) = Cosigned By    Initials Name Provider Type    Nadia Sher OT Occupational Therapist        Outcome Measures     Row Name 09/14/20 1448          How much help from another is currently needed...    Bathing (including washing, rinsing, and drying)  2  -AC     Toileting (which includes using toilet bed pan or urinal)  2  -AC     Putting on and taking off regular upper body clothing  3  -AC     Taking care of personal grooming (such as brushing teeth)  3  -AC     Eating meals  4  -AC       User Key  (r) = Recorded By, (t) = Taken By, (c) = Cosigned By    Initials Name Provider Type    Nadia Sher OT Occupational Therapist        Occupational Therapy Education                 Title: PT OT SLP Therapies (In Progress)     Topic: Occupational Therapy (In Progress)     Point: ADL training (Done)     Description:   Instruct learner(s) on proper safety adaptation and remediation techniques during self care or transfers.   Instruct in proper use of assistive devices.              Learning Progress Summary           Patient Acceptance, E,TB, VU by JACQUELYN at 9/14/2020 1448    Acceptance, E, NR,NL by  at 9/9/2020 0828                   Point: Home exercise program (Not Started)     Description:   Instruct learner(s) on appropriate technique for monitoring, assisting and/or progressing therapeutic exercises/activities.              Learner Progress:  Not documented in this visit.          Point: Precautions (In Progress)     Description:   Instruct learner(s) on prescribed precautions during self-care and functional transfers.              Learning Progress Summary           Patient Acceptance, E, NR,NL by  at 9/9/2020 0828                   Point: Body mechanics (In Progress)     Description:   Instruct learner(s) on proper positioning and spine alignment during self-care, functional mobility activities and/or exercises.              Learning Progress Summary           Patient Acceptance, E, NR,NL by  at  9/9/2020 0828                               User Key     Initials Effective Dates Name Provider Type Discipline     06/23/15 -  Nadia Conklin, OT Occupational Therapist OT     07/18/19 -  Shelli Arzate OT Occupational Therapist OT              OT Recommendation and Plan     Plan of Care Review  Plan of Care Reviewed With: patient  Progress: improving  Outcome Summary: Pt required min A supine to sit ,  CGA to don/doff socks,  mod A STS first time and min A 2nd time,  min A to take 4 sidesteps toward HOB and to take 2 steps forward and 2 back with RW.  Pt with good effort and tolerance to complete 15 reps BUE TE.  Pt is progressing, but limited by weakness and fear of falling.  Plan of Care Reviewed With: patient  Outcome Summary: Pt required min A supine to sit ,  CGA to don/doff socks,  mod A STS first time and min A 2nd time,  min A to take 4 sidesteps toward HOB and to take 2 steps forward and 2 back with RW.  Pt with good effort and tolerance to complete 15 reps BUE TE.  Pt is progressing, but limited by weakness and fear of falling.     Time Calculation:   Time Calculation- OT     Row Name 09/14/20 1532             Time Calculation- OT    OT Start Time  1448  -AC      OT Received On  09/14/20  -      OT Goal Re-Cert Due Date  09/19/20  -         Timed Charges    71618 - OT Therapeutic Exercise Minutes  8  -AC      95184 - OT Therapeutic Activity Minutes  8  -AC        User Key  (r) = Recorded By, (t) = Taken By, (c) = Cosigned By    Initials Name Provider Type    AC Nadia Conklin, OT Occupational Therapist        Therapy Charges for Today     Code Description Service Date Service Provider Modifiers Qty    [id number]  OT THER PROC EA 15 MIN 9/14/2020 Nadia Conklin OT GO 1               Nadia Conklin OT  9/14/2020

## 2020-09-14 NOTE — PLAN OF CARE
Goal Outcome Evaluation:  Plan of Care Reviewed With: patient  Progress: no change  Outcome Summary: VSS, no BM at this shift, stool softener given, c/o BLE pain, pain medication given prn, will continue to monitor.

## 2020-09-15 VITALS
HEART RATE: 61 BPM | DIASTOLIC BLOOD PRESSURE: 58 MMHG | TEMPERATURE: 98.5 F | WEIGHT: 155 LBS | OXYGEN SATURATION: 97 % | RESPIRATION RATE: 16 BRPM | HEIGHT: 65 IN | SYSTOLIC BLOOD PRESSURE: 125 MMHG | BODY MASS INDEX: 25.83 KG/M2

## 2020-09-15 PROBLEM — N39.0 ACUTE UTI (URINARY TRACT INFECTION): Status: RESOLVED | Noted: 2020-09-08 | Resolved: 2020-09-15

## 2020-09-15 PROBLEM — R09.02 HYPOXIA: Status: RESOLVED | Noted: 2020-09-08 | Resolved: 2020-09-15

## 2020-09-15 PROBLEM — R79.89 ELEVATED LFTS: Status: RESOLVED | Noted: 2020-09-08 | Resolved: 2020-09-15

## 2020-09-15 PROBLEM — E87.1 ACUTE HYPONATREMIA: Status: RESOLVED | Noted: 2020-09-08 | Resolved: 2020-09-15

## 2020-09-15 PROBLEM — R53.1 GENERALIZED WEAKNESS: Status: RESOLVED | Noted: 2020-09-08 | Resolved: 2020-09-15

## 2020-09-15 PROBLEM — N17.9 AKI (ACUTE KIDNEY INJURY) (HCC): Status: RESOLVED | Noted: 2020-09-08 | Resolved: 2020-09-15
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
## 2020-09-15 LAB — SARS-COV-2 RNA NOSE QL NAA+PROBE: NOT DETECTED

## 2020-09-15 PROCEDURE — 99239 HOSP IP/OBS DSCHRG MGMT >30: CPT | Performed by: FAMILY MEDICINE

## 2020-09-15 RX ORDER — PRAMIPEXOLE DIHYDROCHLORIDE 1.5 MG/1
1.5 TABLET ORAL 2 TIMES DAILY
Qty: 60 TABLET | Refills: 0 | Status: SHIPPED | OUTPATIENT
Start: 2020-09-15

## 2020-09-15 RX ORDER — HYDROCODONE BITARTRATE AND ACETAMINOPHEN 7.5; 325 MG/1; MG/1
1 TABLET ORAL EVERY 6 HOURS PRN
Qty: 12 TABLET | Refills: 0 | Status: SHIPPED | OUTPATIENT
Start: 2020-09-15

## 2020-09-15 RX ADMIN — PRAMIPEXOLE DIHYDROCHLORIDE 1 MG: 1 TABLET ORAL at 09:32

## 2020-09-15 RX ADMIN — METOPROLOL TARTRATE 50 MG: 50 TABLET, FILM COATED ORAL at 09:33

## 2020-09-15 RX ADMIN — SODIUM CHLORIDE, PRESERVATIVE FREE 10 ML: 5 INJECTION INTRAVENOUS at 09:39

## 2020-09-15 RX ADMIN — AMIODARONE HYDROCHLORIDE 200 MG: 200 TABLET ORAL at 09:32

## 2020-09-15 NOTE — PROGRESS NOTES
Case Management Discharge Note      Final Note: Spoke with patient who is discharging to the [facility]. Daughter will transport her to [facility]. Report can be called into [phone]. No other discharge needs at this time.         Selected Continued Care - Admitted Since 9/8/2020     Destination Coordination complete    Service Provider Selected Services Address Phone Fax    [facility] [address], [place] [id number] [phone]          Durable Medical Equipment    No services have been selected for the patient.              Dialysis/Infusion    No services have been selected for the patient.              Home Medical Care    No services have been selected for the patient.              Therapy    No services have been selected for the patient.              Community Resources    No services have been selected for the patient.                       Final Discharge Disposition Code: 04 - intermediate care facility

## 2020-09-15 NOTE — DISCHARGE SUMMARY
[facility] Medicine Services  DISCHARGE SUMMARY    Patient Name: Zhanna Mccabe  : 1940  MRN: [id number]    Date of Admission: 2020  3:32 PM  Date of Discharge:  9/15/2020  Primary Care Physician: Mackenzie Whitten MD    Consults     Date and Time Order Name Status Description    2020 Inpatient Nephrology Consult Completed     2020 Inpatient Neurosurgery Consult Completed           Hospital Course     Presenting Problem:   Acute hyponatremia [E87.1]  Acute hyponatremia [E87.1]    Active Hospital Problems    Diagnosis  POA   • HTN (hypertension) [I10]  Yes   • HLD (hyperlipidemia) [E78.5]  Unknown   • Depression [F32.9]  Unknown   • A-fib (CMS/HCC) [I48.91]  Unknown   • Lung nodule [R91.1]  Unknown   • Hiatal hernia [K44.9]  Unknown      Resolved Hospital Problems    Diagnosis Date Resolved POA   • **Acute hyponatremia [E87.1] 09/15/2020 Yes   • Fall [W19.XXXA] 09/15/2020 Unknown   • Generalized weakness [R53.1] 09/15/2020 Unknown   • Elevated LFTs [R79.89] 09/15/2020 Unknown   • HALLIE (acute kidney injury) (CMS/HCC) [N17.9] 09/15/2020 Yes   • Acute UTI (urinary tract infection) [N39.0] 09/15/2020 Yes   • Hypoxia [R09.02] 09/15/2020 Unknown          Hospital Course:  Zhanna Mccabe is a 80 y.o. female  with afib on chronic anticoagulation, HTN, HL, IBS, RLS, and nocturnal oxygen use, with recent T/L spine kyphoplasties here with dysuria and AMS     Severe hyponatremia on admission  --resolved  --cymbalta/triamterene/oxybutynin discontinued   --likely ADH mechanism, continue fluid restriction     Encephalopathy: RESOLVED  --hospital delirium, coupled with hyponatremia and sleep deprivation  --recent UTI     Hypoxia: RESOLVED  Lung nodue  --CT with pleural based nodule, Ambulatory referral generated for Lung Nodule Clinic   --on BIPAP/oxygen at night     Acute endplate L3 compression deformity  --NSGY saw in consult, likely not acute, pain control, mobilize. No  need for surgical intervention.   --MRI reviewed  --PT/OT     UTI  --Completed course of Rocephin      Elevated LFTs  --US abnormal with mild dilatation of the CBD at 1.5cm  --MRCP - Status post cholecystectomy with post cholecystectomy ectasia of limited evaluation however no obvious stricturing, contour irregularity  or filling defect to suggest choledocholithiasis most consistent with post cholecystectomy ectasia measuring up to 12 mm in diameter. No pancreas divisum anatomy.     Afib  --xarelto, BB/amio     HTN  --Maxzide discontinued      Depression  --Cymbalta discontinued     Constipation: RESOLVED   -continue bowel regimen      RLS  -Mirapex increased to 1.5mg BID    Discharge Follow Up Recommendations for outpatient labs/diagnostics:  -Follow-up with PCP Dr. Whitten in 3-5 days  -Ambulatory referral generated for Lung Nodule Clinic     Day of Discharge     HPI:   Pt seen and examined. RN notes reviewed. No acute events overnight. Pt doing well this morning, sitting up bed talking on her cellphone. She ate breakfast. No acute complaints, the increased dose of Mirapex helped her legs last night.     Review of Systems  Gen- No fevers, chills  CV- No chest pain, palpitations  Resp- No cough, dyspnea  GI- No N/V/D, abd pain    Vital Signs:   Temp:  [97.5 °F (36.4 °C)-98.2 °F (36.8 °C)] 97.6 °F (36.4 °C)  Heart Rate:  [51-64] 64  Resp:  [16] 16  BP: (116-140)/(45-55) 140/55     Physical Exam:  Constitutional: No acute distress, awake, alert  HENT: NCAT, mucous membranes moist  Respiratory: Clear to auscultation bilaterally, respiratory effort normal   Cardiovascular: RRR, no murmurs, rubs, or gallops, palpable pedal pulses bilaterally  Gastrointestinal: Positive bowel sounds, soft, nontender, nondistended  Musculoskeletal: No bilateral ankle edema  Psychiatric: Appropriate affect, cooperative  Neurologic: Oriented x 3, strength symmetric in all extremities, Cranial Nerves grossly intact to confrontation, speech  clear  Skin: No rashes    Pertinent  and/or Most Recent Results     Results from last 7 days   Lab Units 09/14/20 0405 09/13/20  0543 09/12/20  2317 09/12/20  1602 09/12/20  0400 09/11/20  2305 09/11/20  1814  09/10/20  0611  09/09/20 0412 09/08/20  1438   WBC 10*3/mm3 4.66 5.30  --   --   --   --   --   --  9.41  --  6.44  --  8.59   HEMOGLOBIN g/dL 10.6* 10.5*  --   --   --   --   --   --  11.9*  --  10.5*  --  12.0   HEMATOCRIT % 35.4 35.2  --   --   --   --   --   --  37.2  --  33.6*  --  37.4   PLATELETS 10*3/mm3 288 289  --   --   --   --   --   --  289  --  218  --  250   SODIUM mmol/L 136 136 136 135* 133* 131* 129*   < > 127*   < > 120*   < > 116*   POTASSIUM mmol/L 4.2 3.9  --   --   --   --   --   --  3.8  --  4.2  --  4.6   CHLORIDE mmol/L 101 101  --   --   --   --   --   --  91*  --  86*  --  81*   CO2 mmol/L 25.0 26.0  --   --   --   --   --   --  23.0  --  24.0  --  23.0   BUN mg/dL 29* 33*  --   --   --   --   --   --  23  --  27*  --  28*   CREATININE mg/dL 0.83 0.95  --   --   --   --   --   --  0.96  --  0.93  --  1.12*   GLUCOSE mg/dL 107* 120*  --   --   --   --   --   --  139*  --  104*  --  113*   CALCIUM mg/dL 8.9 8.8  --   --   --   --   --   --  9.5  --  8.9  --  9.8    < > = values in this interval not displayed.     Results from last 7 days   Lab Units 09/09/20 0412 09/08/20  1438   BILIRUBIN mg/dL 1.3* 2.2*   ALK PHOS U/L 178* 206*   ALT (SGPT) U/L 111* 114*   AST (SGOT) U/L 107* 121*           Invalid input(s): TG, LDLCALC, LDLREALC  Results from last 7 days   Lab Units 09/08/20  1438   PROBNP pg/mL 1,159.0   TROPONIN T ng/mL <0.010   PROCALCITONIN ng/mL 0.17   LACTATE mmol/L 1.1       Brief Urine Lab Results  (Last result in the past 365 days)      Color   Clarity   Blood   Leuk Est   Nitrite   Protein   CREAT   Urine HCG        09/08/20 1650 Yellow Clear Negative Negative Positive Negative               Microbiology Results Abnormal     Procedure Component Value - Date/Time     COVID PRE-OP / PRE-PROCEDURE SCREENING ORDER (NO ISOLATION) - Swab, Nasopharynx [[id number]] Collected: 09/14/20 1200    Lab Status: Final result Specimen: Swab from Nasopharynx Updated: 09/15/20 1049    Narrative:      The following orders were created for panel order COVID PRE-OP / PRE-PROCEDURE SCREENING ORDER (NO ISOLATION) - Swab, Nasopharynx.  Procedure                               Abnormality         Status                     ---------                               -----------         ------                     COVID-19,[facility], NP ...[[id number]]                      Final result                 Please view results for these tests on the individual orders.    COVID-19,[facility], NP SWAB IN LEXAR VIRAL TRANSPORT MEDIA 24-30 HR TAT - Swab, Nasopharynx [[id number]] Collected: 09/14/20 1200    Lab Status: Final result Specimen: Swab from Nasopharynx Updated: 09/15/20 1049     SARS-CoV-2 ALAN Not Detected    Urine Culture - Urine, Urine, Catheter [[id number]]  (Normal) Collected: 09/08/20 1650    Lab Status: Final result Specimen: Urine, Catheter Updated: 09/09/20 2207     Urine Culture No growth    COVID-19,[facility], NP SWAB IN LEXAR VIRAL TRANSPORT MEDIA 24-30 HR TAT - Swab, Nasopharynx [[id number]] Collected: 09/08/20 1818    Lab Status: Final result Specimen: Swab from Nasopharynx Updated: 09/09/20 1501     SARS-CoV-2 ALAN Not Detected          Imaging Results (All)     Procedure Component Value Units Date/Time    MRI Thoracic Spine Without Contrast [[id number]] Collected: 09/11/20 1052     Updated: 09/12/20 1604    Narrative:      EXAMINATION: MRI ABDOMEN WO CONTRAST MRCP-, MRI THORACIC SPINE WO  CONTRAST-, MRI LUMBAR SPINE WO CONTRAST- 09/11/2020     INDICATION: abnormal US, lfts; E87.4-Bdkn-qfogpvsqyu and hyponatremia;  R29.6-Repeated falls; R41.82-Altered mental status, unspecified;  R09.02-Hypoxemia; I10-Essential (primary) hypertension; Z74.09-Other  reduced mobility; Z78.9-Other specified health  status     TECHNIQUE: Multiplanar MRI of the abdomen without intravenous contrast  following MRCP protocol, multiplanar MRI of the thoracic spine without  intravenous contrast, multiplanar MRI of the lumbar spine without  intravenous contrast.     COMPARISON: Lumbar spine radiographs 07/28/2020     FINDINGS:      ABDOMEN: Moderate motion degraded examination. Lung bases demonstrate  asymmetric elevation of the right hemidiaphragm with adjacent  atelectasis otherwise lung bases are clear. Liver contains a few  scattered T2 hyperintense well-defined areas of likely hepatic cyst  formation. No significant signal drop on opposed phase imaging to  suggest steatotic component. Gallbladder is surgically absent. Motion  degraded MRCP sequences however noted common bile duct measuring up to  12 mm with tapering to the level of the ampulla identified of likely  postcholecystectomy ectasia without distinct intrahepatic biliary  dilatation or irregularity evident. No stricturing or shouldering  evident and no filling defect to suggest choledocholithiasis. No  pancreas divisum anatomy with normal branching anatomy of the pancreatic  duct. Pancreas and spleen grossly unremarkable. Adrenals without  distinct nodule. Kidneys without hydronephrosis or hydroureter.  Visualized GI tract grossly unremarkable without disproportionate  dilatation or mechanical obstructive process evident on partial imaging.  No ascites.     T-spine: Mild dextroscoliotic curvature of thoracic spine with sagittal  data sets revealing normal thoracic kyphotic curvature without focal  subluxation or listhesis. Vertebral body heights are preserved apart  from T11 and T12 levels which demonstrate inferior endplate irregularity  T11 and mild height loss along with T12 kyphoplasty repair. No  aggressive bone marrow signal findings of abnormal bone marrow edema  otherwise noted with grossly normal caliber and contour of the thoracic  cord and spinal canal.  Paraspinal soft tissues unremarkable for  paraspinal hematoma or acute soft tissue findings in the paraspinal  region. No critical spinal canal stenosis by osseous means and no gross  neuroforaminal stenosis with perineural fat largely well preserved apart  from mild to moderate neuroforaminal stenosis at the right T9-T10 level.     L-spine: Sagittal datasets reveal grossly normal alignment without focal  subluxation or listhesis however multilevel compression deformities with  kyphoplasty repair at L1 and L4 with associated artifact from  kyphoplasty repair. Mild retropulsion greatest at the L1 level where  there is posterior wall retropulsion involvement 6 mm creating at least  mild to moderate spinal canal stenosis at this level along with  intermixed spondylitic changes at the L3-L4 interspace level with mild  to moderate spinal canal stenosis and minimal 3 mm retropulsion L4 level  along with abnormal bone marrow signal and endplate irregularity of the  L3 level consistent with edema from acute or recent compression fracture  deformity with 25-50% height loss however no retropulsion. Facets are  well aligned. No critical spinal canal narrowing and no paraspinal  hematoma. SI joints grossly symmetric on limited imaging.       Impression:      1. Abdomen severely motion degraded demonstrating several areas of  well-defined T2 hyperintense lesions most consistent with small hepatic  cysts otherwise no parenchymal process evident or steatotic component.     2. Status post cholecystectomy with post cholecystectomy ectasia of  limited evaluation however no obvious stricturing, contour irregularity  or filling defect to suggest choledocholithiasis most consistent with  post cholecystectomy ectasia measuring up to 12 mm in diameter. No  pancreas divisum anatomy.     3. Acute or recent L3 compression fracture deformity involving the  superior endplate with 50% height loss greatest in the midportion  without retropulsion  or posterior element involvement and no critical  spinal canal stenosis at this level with multilevel compression  deformities thoracolumbar junction and lumbar spine as detailed above  with prior kyphoplasty repairs at these areas.     D:  09/11/2020  E:  09/11/2020     This report was finalized on 9/12/2020 4:01 PM by Dr. Lincoln Mallory.       MRI abdomen wo contrast mrcp [[id number]] Collected: 09/11/20 1052     Updated: 09/12/20 1604    Narrative:      EXAMINATION: MRI ABDOMEN WO CONTRAST MRCP-, MRI THORACIC SPINE WO  CONTRAST-, MRI LUMBAR SPINE WO CONTRAST- 09/11/2020     INDICATION: abnormal US, lfts; E87.6-Evuw-wxjjkgudoc and hyponatremia;  R29.6-Repeated falls; R41.82-Altered mental status, unspecified;  R09.02-Hypoxemia; I10-Essential (primary) hypertension; Z74.09-Other  reduced mobility; Z78.9-Other specified health status     TECHNIQUE: Multiplanar MRI of the abdomen without intravenous contrast  following MRCP protocol, multiplanar MRI of the thoracic spine without  intravenous contrast, multiplanar MRI of the lumbar spine without  intravenous contrast.     COMPARISON: Lumbar spine radiographs 07/28/2020     FINDINGS:      ABDOMEN: Moderate motion degraded examination. Lung bases demonstrate  asymmetric elevation of the right hemidiaphragm with adjacent  atelectasis otherwise lung bases are clear. Liver contains a few  scattered T2 hyperintense well-defined areas of likely hepatic cyst  formation. No significant signal drop on opposed phase imaging to  suggest steatotic component. Gallbladder is surgically absent. Motion  degraded MRCP sequences however noted common bile duct measuring up to  12 mm with tapering to the level of the ampulla identified of likely  postcholecystectomy ectasia without distinct intrahepatic biliary  dilatation or irregularity evident. No stricturing or shouldering  evident and no filling defect to suggest choledocholithiasis. No  pancreas divisum anatomy with normal branching  anatomy of the pancreatic  duct. Pancreas and spleen grossly unremarkable. Adrenals without  distinct nodule. Kidneys without hydronephrosis or hydroureter.  Visualized GI tract grossly unremarkable without disproportionate  dilatation or mechanical obstructive process evident on partial imaging.  No ascites.     T-spine: Mild dextroscoliotic curvature of thoracic spine with sagittal  data sets revealing normal thoracic kyphotic curvature without focal  subluxation or listhesis. Vertebral body heights are preserved apart  from T11 and T12 levels which demonstrate inferior endplate irregularity  T11 and mild height loss along with T12 kyphoplasty repair. No  aggressive bone marrow signal findings of abnormal bone marrow edema  otherwise noted with grossly normal caliber and contour of the thoracic  cord and spinal canal. Paraspinal soft tissues unremarkable for  paraspinal hematoma or acute soft tissue findings in the paraspinal  region. No critical spinal canal stenosis by osseous means and no gross  neuroforaminal stenosis with perineural fat largely well preserved apart  from mild to moderate neuroforaminal stenosis at the right T9-T10 level.     L-spine: Sagittal datasets reveal grossly normal alignment without focal  subluxation or listhesis however multilevel compression deformities with  kyphoplasty repair at L1 and L4 with associated artifact from  kyphoplasty repair. Mild retropulsion greatest at the L1 level where  there is posterior wall retropulsion involvement 6 mm creating at least  mild to moderate spinal canal stenosis at this level along with  intermixed spondylitic changes at the L3-L4 interspace level with mild  to moderate spinal canal stenosis and minimal 3 mm retropulsion L4 level  along with abnormal bone marrow signal and endplate irregularity of the  L3 level consistent with edema from acute or recent compression fracture  deformity with 25-50% height loss however no retropulsion. Facets  are  well aligned. No critical spinal canal narrowing and no paraspinal  hematoma. SI joints grossly symmetric on limited imaging.       Impression:      1. Abdomen severely motion degraded demonstrating several areas of  well-defined T2 hyperintense lesions most consistent with small hepatic  cysts otherwise no parenchymal process evident or steatotic component.     2. Status post cholecystectomy with post cholecystectomy ectasia of  limited evaluation however no obvious stricturing, contour irregularity  or filling defect to suggest choledocholithiasis most consistent with  post cholecystectomy ectasia measuring up to 12 mm in diameter. No  pancreas divisum anatomy.     3. Acute or recent L3 compression fracture deformity involving the  superior endplate with 50% height loss greatest in the midportion  without retropulsion or posterior element involvement and no critical  spinal canal stenosis at this level with multilevel compression  deformities thoracolumbar junction and lumbar spine as detailed above  with prior kyphoplasty repairs at these areas.     D:  09/11/2020  E:  09/11/2020     This report was finalized on 9/12/2020 4:01 PM by Dr. Lincoln Mallory.       MRI Lumbar Spine Without Contrast [[id number]] Collected: 09/11/20 1052     Updated: 09/12/20 1604    Narrative:      EXAMINATION: MRI ABDOMEN WO CONTRAST MRCP-, MRI THORACIC SPINE WO  CONTRAST-, MRI LUMBAR SPINE WO CONTRAST- 09/11/2020     INDICATION: abnormal US, lfts; E87.4-Bvbh-dofvyguiky and hyponatremia;  R29.6-Repeated falls; R41.82-Altered mental status, unspecified;  R09.02-Hypoxemia; I10-Essential (primary) hypertension; Z74.09-Other  reduced mobility; Z78.9-Other specified health status     TECHNIQUE: Multiplanar MRI of the abdomen without intravenous contrast  following MRCP protocol, multiplanar MRI of the thoracic spine without  intravenous contrast, multiplanar MRI of the lumbar spine without  intravenous contrast.     COMPARISON: Lumbar  spine radiographs 07/28/2020     FINDINGS:      ABDOMEN: Moderate motion degraded examination. Lung bases demonstrate  asymmetric elevation of the right hemidiaphragm with adjacent  atelectasis otherwise lung bases are clear. Liver contains a few  scattered T2 hyperintense well-defined areas of likely hepatic cyst  formation. No significant signal drop on opposed phase imaging to  suggest steatotic component. Gallbladder is surgically absent. Motion  degraded MRCP sequences however noted common bile duct measuring up to  12 mm with tapering to the level of the ampulla identified of likely  postcholecystectomy ectasia without distinct intrahepatic biliary  dilatation or irregularity evident. No stricturing or shouldering  evident and no filling defect to suggest choledocholithiasis. No  pancreas divisum anatomy with normal branching anatomy of the pancreatic  duct. Pancreas and spleen grossly unremarkable. Adrenals without  distinct nodule. Kidneys without hydronephrosis or hydroureter.  Visualized GI tract grossly unremarkable without disproportionate  dilatation or mechanical obstructive process evident on partial imaging.  No ascites.     T-spine: Mild dextroscoliotic curvature of thoracic spine with sagittal  data sets revealing normal thoracic kyphotic curvature without focal  subluxation or listhesis. Vertebral body heights are preserved apart  from T11 and T12 levels which demonstrate inferior endplate irregularity  T11 and mild height loss along with T12 kyphoplasty repair. No  aggressive bone marrow signal findings of abnormal bone marrow edema  otherwise noted with grossly normal caliber and contour of the thoracic  cord and spinal canal. Paraspinal soft tissues unremarkable for  paraspinal hematoma or acute soft tissue findings in the paraspinal  region. No critical spinal canal stenosis by osseous means and no gross  neuroforaminal stenosis with perineural fat largely well preserved apart  from mild to  moderate neuroforaminal stenosis at the right T9-T10 level.     L-spine: Sagittal datasets reveal grossly normal alignment without focal  subluxation or listhesis however multilevel compression deformities with  kyphoplasty repair at L1 and L4 with associated artifact from  kyphoplasty repair. Mild retropulsion greatest at the L1 level where  there is posterior wall retropulsion involvement 6 mm creating at least  mild to moderate spinal canal stenosis at this level along with  intermixed spondylitic changes at the L3-L4 interspace level with mild  to moderate spinal canal stenosis and minimal 3 mm retropulsion L4 level  along with abnormal bone marrow signal and endplate irregularity of the  L3 level consistent with edema from acute or recent compression fracture  deformity with 25-50% height loss however no retropulsion. Facets are  well aligned. No critical spinal canal narrowing and no paraspinal  hematoma. SI joints grossly symmetric on limited imaging.       Impression:      1. Abdomen severely motion degraded demonstrating several areas of  well-defined T2 hyperintense lesions most consistent with small hepatic  cysts otherwise no parenchymal process evident or steatotic component.     2. Status post cholecystectomy with post cholecystectomy ectasia of  limited evaluation however no obvious stricturing, contour irregularity  or filling defect to suggest choledocholithiasis most consistent with  post cholecystectomy ectasia measuring up to 12 mm in diameter. No  pancreas divisum anatomy.     3. Acute or recent L3 compression fracture deformity involving the  superior endplate with 50% height loss greatest in the midportion  without retropulsion or posterior element involvement and no critical  spinal canal stenosis at this level with multilevel compression  deformities thoracolumbar junction and lumbar spine as detailed above  with prior kyphoplasty repairs at these areas.     D:  09/11/2020  E:  09/11/2020      This report was finalized on 9/12/2020 4:01 PM by Dr. Lincoln Mallory.       CT Chest Without Contrast [[id number]] Collected: 09/08/20 1810     Updated: 09/09/20 2146    Narrative:      EXAMINATION: CT CHEST WO CONTRAST-      INDICATION: Generalized weakness; E87.9-Hrfc-bivgyuhcyd and  hyponatremia; R29.6-Repeated falls; R41.82-Altered mental status,  unspecified; R09.02-Hypoxemia; I10-Essential (primary) hypertension.     TECHNIQUE: Spiral acquisition 5 mm axial images through the chest and  upper abdomen.     The radiation dose reduction device was turned on for each scan per the  ALARA (As Low as Reasonably Achievable) protocol.     COMPARISON: No recent comparison exams. 04/07/2005 chest CT scan.     FINDINGS: History indicates increasing dyspnea X2 days.     There appears to be some chronic left lung volume loss similar to the  prior study. There is mild left lower lobe bronchiectasis associated  with a small area of atelectasis along the left upper margin of the  patient's large hiatal hernia. Small focus of interstitial disease is  seen in the inferior tip of the lingula and may be either acute or  chronic but is minimal in extent. There is a new pleural-based nodule, 8  mm in diameter in the lingula. Left lung otherwise appears clear. Trace  linear scarring in the right lung apex is stable. Right lung otherwise  appears clear except for a calcified upper lobe granuloma. There is no  pleural effusion. Hiatal hernia is approximately 8.5 cm in diameter.  Mediastinal window images show significant reflux into the upper  esophagus. No mediastinal mass, adenopathy or pericardial effusion is  appreciated. There is extensive coronary artery calcification.     Included images of the upper abdomen show no significant abnormalities  of the visualized portions of the liver, spleen, pancreas, adrenal  glands, or upper renal poles. Gallbladder appears to be surgically  absent. No upper abdominal free air, ascites or  adenopathy is seen.  There are lower thoracic and upper lumbar kyphoplasties, and what  appears to be a potential acute fracture at what appears to be L3.  Please refer to sagittal image 71 series 900.       Impression:      1. Moderate to large hiatal hernia and mild associated atelectasis.  2. Trace interstitial disease in the lingula most likely some  generalized atelectasis. Inflammation, if present, is minimal.  3. Round and rather bland appearing pleural-based 8 mm nodule in the  lingula, but new since 2005. Consider followup through [facility]  pulmonary nodule clinic.  4. Incidentally noted previous thoracic and lumbar kyphoplasties and  what appears to be acute superior endplate compression deformity of L3.     D:  09/08/2020  E:  09/09/2020     This report was finalized on 9/9/2020 9:43 PM by Dr. Jose Ann MD.        Liver [[id number]] Collected: 09/09/20 0857     Updated: 09/09/20 1659    Narrative:      EXAMINATION:  LIVER-09/09/2020:     INDICATION: Elevated LFTs; E87.9-Njny-hxvljzbrnn and hyponatremia;  R29.6-Repeated falls; R41.82-Altered mental status, unspecified;  R09.02-Hypoxemia; I10-Essential (primary) hypertension, abnormal liver  enzymes.     TECHNIQUE: Sonographic imaging was obtained of the right upper quadrant  in both the sagittal and transverse planes.     COMPARISON: NONE.     FINDINGS: The pancreas is homogeneous in its visualized portions. The  liver is homogeneous and normal in echotexture and echogenicity with no  focal mass or intrahepatic biliary ductal dilatation. The gallbladder  fossa is unremarkable.  The common bile duct is mildly dilated at 1.5  cm. The right kidney is normal in size, configuration, and texture  measuring in length from pole to pole 9.9 cm. No solid cortical mass or  renal cortical cysts seen within the right kidney. No hydronephrosis or  nephrolithiasis.       Impression:      Mild dilatation of the common bile duct at 1.5 cm. The  remainder of the  right upper quadrant ultrasound is unremarkable.     D:  09/09/2020  E:  09/09/2020            This report was finalized on 9/9/2020 4:56 PM by Dr. Yu Lopez MD.             Results for orders placed during the hospital encounter of 07/18/20   Duplex Venous Lower Extremity - Left    Narrative · Normal left lower extremity venous duplex scan.          Results for orders placed during the hospital encounter of 07/18/20   Duplex Venous Lower Extremity - Left    Narrative · Normal left lower extremity venous duplex scan.               Plan for Follow-up of Pending Labs/Results: PCP    Discharge Details        Discharge Medications      Changes to Medications      Instructions Start Date   pramipexole 1.5 MG tablet  Commonly known as: MIRAPEX  What changed:   · medication strength  · how much to take   1.5 mg, Oral, 2 times daily         Continue These Medications      Instructions Start Date   amiodarone 200 MG tablet  Commonly known as: PACERONE   200 mg, Oral, Daily      docusate sodium 100 MG capsule  Commonly known as: COLACE   100 mg, Oral, 2 Times Daily PRN      HYDROcodone-acetaminophen 7.5-325 MG per tablet  Commonly known as: NORCO   1 tablet, Oral, Every 6 Hours PRN      metoprolol tartrate 25 MG tablet  Commonly known as: LOPRESSOR   50 mg, Oral, 2 Times Daily      Xarelto 20 MG tablet  Generic drug: rivaroxaban   20 mg, Oral, Daily With Dinner         Stop These Medications    cefuroxime 250 MG tablet  Commonly known as: CEFTIN     DULoxetine 30 MG capsule  Commonly known as: CYMBALTA     oxybutynin XL 10 MG 24 hr tablet  Commonly known as: DITROPAN-XL     triamterene-hydrochlorothiazide 75-50 MG per tablet  Commonly known as: MAXZIDE            Allergies   Allergen Reactions   • Abilify [Aripiprazole] Swelling     Of mouth and lips   • Ambien [Zolpidem] Other (See Comments)     Sleep walks   • Lyrica [Pregabalin] Swelling     Of mouth and lips         Discharge Disposition:  Skilled Nursing  Facility (DC - External)    Diet:  Hospital:  Diet Order   Procedures   • Diet Regular       Activity:      Restrictions or Other Recommendations:       CODE STATUS:    Code Status and Medical Interventions:   Ordered at: 09/08/20 2035     Level Of Support Discussed With:    Patient     Code Status:    CPR     Medical Interventions (Level of Support Prior to Arrest):    Full       No future appointments.    Additional Instructions for the Follow-ups that You Need to Schedule     Ambulatory Referral to [facility]   As directed      Discharge Follow-up with PCP   As directed       Currently Documented PCP:    Mackenzie Whitten MD    PCP Phone Number:    [phone]     Follow Up Details: 3-5 days               Time Spent on Discharge:  I spent  40 minutes on this discharge activity which included: face-to-face encounter with the patient, reviewing the data in the system, coordination of the care with the nursing staff as well as consultants, documentation, and entering orders.

## 2020-09-27 ENCOUNTER — LAB REQUISITION (OUTPATIENT)
Dept: LAB | Facility: HOSPITAL | Age: 80
End: 2020-09-27

## 2020-09-27 DIAGNOSIS — Z00.00 ROUTINE GENERAL MEDICAL EXAMINATION AT A HEALTH CARE FACILITY: ICD-10-CM

## 2020-09-27 LAB
ALBUMIN SERPL-MCNC: 3.1 G/DL (ref 3.5–5.2)
ALBUMIN/GLOB SERPL: 1.3 G/DL
ALP SERPL-CCNC: 162 U/L (ref 39–117)
ALT SERPL W P-5'-P-CCNC: 21 U/L (ref 1–33)
ANION GAP SERPL CALCULATED.3IONS-SCNC: 12 MMOL/L (ref 5–15)
AST SERPL-CCNC: 28 U/L (ref 1–32)
BILIRUB SERPL-MCNC: 0.2 MG/DL (ref 0–1.2)
BUN SERPL-MCNC: 18 MG/DL (ref 8–23)
BUN/CREAT SERPL: 19.4 (ref 7–25)
CALCIUM SPEC-SCNC: 8.6 MG/DL (ref 8.6–10.5)
CHLORIDE SERPL-SCNC: 100 MMOL/L (ref 98–107)
CO2 SERPL-SCNC: 25 MMOL/L (ref 22–29)
CREAT SERPL-MCNC: 0.93 MG/DL (ref 0.57–1)
GFR SERPL CREATININE-BSD FRML MDRD: 58 ML/MIN/1.73
GLOBULIN UR ELPH-MCNC: 2.3 GM/DL
GLUCOSE SERPL-MCNC: 83 MG/DL (ref 65–99)
POTASSIUM SERPL-SCNC: 4.7 MMOL/L (ref 3.5–5.2)
PROT SERPL-MCNC: 5.4 G/DL (ref 6–8.5)
SODIUM SERPL-SCNC: 137 MMOL/L (ref 136–145)

## 2020-09-27 PROCEDURE — 80053 COMPREHEN METABOLIC PANEL: CPT

## 2020-10-01 ENCOUNTER — OFFICE VISIT (OUTPATIENT)
Age: 80
End: 2020-10-01

## 2020-10-02 ENCOUNTER — LAB REQUISITION (OUTPATIENT)
Dept: LAB | Facility: HOSPITAL | Age: 80
End: 2020-10-02

## 2020-10-02 DIAGNOSIS — Z00.00 ROUTINE GENERAL MEDICAL EXAMINATION AT A HEALTH CARE FACILITY: ICD-10-CM

## 2020-10-02 LAB
ALBUMIN SERPL-MCNC: 3.8 G/DL (ref 3.5–5.2)
ALBUMIN/GLOB SERPL: 1.7 G/DL
ALP SERPL-CCNC: 151 U/L (ref 39–117)
ALT SERPL W P-5'-P-CCNC: 18 U/L (ref 1–33)
ANION GAP SERPL CALCULATED.3IONS-SCNC: 13 MMOL/L (ref 5–15)
AST SERPL-CCNC: 20 U/L (ref 1–32)
BILIRUB SERPL-MCNC: 0.5 MG/DL (ref 0–1.2)
BUN SERPL-MCNC: 25 MG/DL (ref 8–23)
BUN/CREAT SERPL: 25.8 (ref 7–25)
CALCIUM SPEC-SCNC: 8.8 MG/DL (ref 8.6–10.5)
CHLORIDE SERPL-SCNC: 100 MMOL/L (ref 98–107)
CO2 SERPL-SCNC: 21 MMOL/L (ref 22–29)
CREAT SERPL-MCNC: 0.97 MG/DL (ref 0.57–1)
GFR SERPL CREATININE-BSD FRML MDRD: 55 ML/MIN/1.73
GLOBULIN UR ELPH-MCNC: 2.3 GM/DL
GLUCOSE SERPL-MCNC: 83 MG/DL (ref 65–99)
HCT VFR BLD AUTO: 29.8 % (ref 34–46.6)
HGB BLD-MCNC: 8.5 G/DL (ref 12–15.9)
POTASSIUM SERPL-SCNC: 4.7 MMOL/L (ref 3.5–5.2)
PROT SERPL-MCNC: 6.1 G/DL (ref 6–8.5)
SODIUM SERPL-SCNC: 134 MMOL/L (ref 136–145)

## 2020-10-02 PROCEDURE — 85014 HEMATOCRIT: CPT

## 2020-10-02 PROCEDURE — 85018 HEMOGLOBIN: CPT

## 2020-10-02 PROCEDURE — 80053 COMPREHEN METABOLIC PANEL: CPT

## 2021-02-12 ENCOUNTER — OFFICE VISIT (OUTPATIENT)
Dept: URBAN - METROPOLITAN AREA CLINIC 7 | Facility: CLINIC | Age: 81
End: 2021-02-12

## 2021-02-23 ENCOUNTER — TELEPHONE ENCOUNTER (OUTPATIENT)
Dept: URBAN - METROPOLITAN AREA CLINIC 9 | Facility: CLINIC | Age: 81
End: 2021-02-23

## 2021-03-04 ENCOUNTER — OFFICE VISIT (OUTPATIENT)
Dept: URBAN - METROPOLITAN AREA SURGERY CENTER 5 | Facility: SURGERY CENTER | Age: 81
End: 2021-03-04

## 2022-04-25 NOTE — ED PROVIDER NOTES
" EMERGENCY DEPARTMENT ENCOUNTER    Room Number:  S231/1  Date of encounter:  9/8/2020  PCP: Mackenzie Whitten MD  Historian: patient and family      HPI:  Chief Complaint: multiple falls and MS    A complete HPI/ROS/PMH/PSH/SH/FH are unobtainable due to: NA    Context: Zhanna Mccabe is a 80 y.o. female who presents to the ED c/o   low back pain, frequency, dysuria.  No fevers chills or sweats.  According to her daughter, she has been stumbling and \"talking out of her head\" recently. Multiple falls in the past few weeks, secondary to weakness.  No dizziness or lightheadedness.  No chest pain palpitations.see increasing shortness of breath over the past 2 days.  Wears 3 L of oxygen at night secondary to nocturnal hypoxia.  She does have a history of A. fib, hypertension, depression, hyperlipidemia, IBS, restless leg syndrome, with a history of breast cancer.  Primary care provider is Dr. Waller.          PAST MEDICAL HISTORY  Active Ambulatory Problems     Diagnosis Date Noted   • No Active Ambulatory Problems     Resolved Ambulatory Problems     Diagnosis Date Noted   • No Resolved Ambulatory Problems     Past Medical History:   Diagnosis Date   • A-fib (CMS/HCC)    • Breast cancer (CMS/HCC) 1989   • Depression    • High cholesterol    • Hypertension    • IBS (irritable bowel syndrome)    • Restless leg          PAST SURGICAL HISTORY  Past Surgical History:   Procedure Laterality Date   • ARM SKIN LESION BIOPSY / EXCISION      melanoma   • BLADDER REPAIR      prolapse   • BREAST BIOPSY     • CHOLECYSTECTOMY     • HAND SURGERY Right     4th & 5th fingers   • HYSTERECTOMY  1983   • HYSTERECTOMY     • KNEE SURGERY Left     ORIF   • MASTECTOMY Left 1989   • MASTECTOMY           FAMILY HISTORY  Family History   Problem Relation Age of Onset   • Breast cancer Maternal Aunt 68   • Breast cancer Maternal Aunt 68   • Cancer Mother    • Hypertension Mother    • Cancer Father    • BRCA 1/2 Neg Hx          SOCIAL HISTORY  Social " History     Socioeconomic History   • Marital status:      Spouse name: Not on file   • Number of children: Not on file   • Years of education: Not on file   • Highest education level: Not on file   Tobacco Use   • Smoking status: Never Smoker   • Smokeless tobacco: Never Used   Substance and Sexual Activity   • Alcohol use: No     Frequency: Never   • Drug use: No   • Sexual activity: Defer         ALLERGIES  Abilify [aripiprazole]; Ambien [zolpidem]; and Lyrica [pregabalin]        REVIEW OF SYSTEMS  Review of Systems     All systems reviewed and negative except for those discussed in HPI.       PHYSICAL EXAM    I have reviewed the triage vital signs and nursing notes.    ED Triage Vitals [09/08/20 1417]   Temp Heart Rate Resp BP SpO2   97.1 °F (36.2 °C) 55 18 155/64 (!) 88 %      Temp src Heart Rate Source Patient Position BP Location FiO2 (%)   Infrared Monitor Sitting Left arm --       Physical Exam  GENERAL:  Well developed.  Appears in no acute distress.  Hemodynamically stable.  Oxygen saturations 88% on room air.  83% on room air upon presentation.  HENT: Nares patent, conjunctive a pale, general pallor noted.  EYES: No scleral icterus  CV: Regular rhythm, regular rate no murmurs rubs or gallops.  RESPIRATORY: Normal effort.  No audible wheezes, rales or rhonchi  ABDOMEN: Soft, nontender, no guarding or rebound.  Bowel sounds are normal.  Patient seems to have been incontinent of urine.  MUSCULOSKELETAL: No deformities.   NEURO: Alert, moves all extremities, follows commands  SKIN: Warm, dry, no rash visualized        LAB RESULTS  Recent Results (from the past 24 hour(s))   Comprehensive Metabolic Panel    Collection Time: 09/08/20  2:38 PM   Result Value Ref Range    Glucose 113 (H) 65 - 99 mg/dL    BUN 28 (H) 8 - 23 mg/dL    Creatinine 1.12 (H) 0.57 - 1.00 mg/dL    Sodium 116 (C) 136 - 145 mmol/L    Potassium 4.6 3.5 - 5.2 mmol/L    Chloride 81 (L) 98 - 107 mmol/L    CO2 23.0 22.0 - 29.0 mmol/L     Calcium 9.8 8.6 - 10.5 mg/dL    Total Protein 7.4 6.0 - 8.5 g/dL    Albumin 4.40 3.50 - 5.20 g/dL    ALT (SGPT) 114 (H) 1 - 33 U/L    AST (SGOT) 121 (H) 1 - 32 U/L    Alkaline Phosphatase 206 (H) 39 - 117 U/L    Total Bilirubin 2.2 (H) 0.0 - 1.2 mg/dL    eGFR Non African Amer 47 (L) >60 mL/min/1.73    Globulin 3.0 gm/dL    A/G Ratio 1.5 g/dL    BUN/Creatinine Ratio 25.0 7.0 - 25.0    Anion Gap 12.0 5.0 - 15.0 mmol/L   Digoxin Level    Collection Time: 09/08/20  2:38 PM   Result Value Ref Range    Digoxin <0.30 (L) 0.60 - 1.20 ng/mL   CBC Auto Differential    Collection Time: 09/08/20  2:38 PM   Result Value Ref Range    WBC 8.59 3.40 - 10.80 10*3/mm3    RBC 4.62 3.77 - 5.28 10*6/mm3    Hemoglobin 12.0 12.0 - 15.9 g/dL    Hematocrit 37.4 34.0 - 46.6 %    MCV 81.0 79.0 - 97.0 fL    MCH 26.0 (L) 26.6 - 33.0 pg    MCHC 32.1 31.5 - 35.7 g/dL    RDW 15.4 12.3 - 15.4 %    RDW-SD 45.1 37.0 - 54.0 fl    MPV 9.8 6.0 - 12.0 fL    Platelets 250 140 - 450 10*3/mm3    Neutrophil % 73.8 42.7 - 76.0 %    Lymphocyte % 9.3 (L) 19.6 - 45.3 %    Monocyte % 14.2 (H) 5.0 - 12.0 %    Eosinophil % 0.3 0.3 - 6.2 %    Basophil % 0.5 0.0 - 1.5 %    Immature Grans % 1.9 (H) 0.0 - 0.5 %    Neutrophils, Absolute 6.34 1.70 - 7.00 10*3/mm3    Lymphocytes, Absolute 0.80 0.70 - 3.10 10*3/mm3    Monocytes, Absolute 1.22 (H) 0.10 - 0.90 10*3/mm3    Eosinophils, Absolute 0.03 0.00 - 0.40 10*3/mm3    Basophils, Absolute 0.04 0.00 - 0.20 10*3/mm3    Immature Grans, Absolute 0.16 (H) 0.00 - 0.05 10*3/mm3    nRBC 0.0 0.0 - 0.2 /100 WBC   BNP    Collection Time: 09/08/20  2:38 PM   Result Value Ref Range    proBNP 1,159.0 0.0-1,800.0 pg/mL   Troponin    Collection Time: 09/08/20  2:38 PM   Result Value Ref Range    Troponin T <0.010 0.000 - 0.030 ng/mL   Type & Screen    Collection Time: 09/08/20  2:38 PM   Result Value Ref Range    ABO Type O     RH type Negative     Antibody Screen Negative     T&S Expiration Date 9/11/2020 11:59:59 PM    Light Blue Top     Collection Time: 09/08/20  2:38 PM   Result Value Ref Range    Extra Tube hold for add-on    Green Top (Gel)    Collection Time: 09/08/20  2:38 PM   Result Value Ref Range    Extra Tube Hold for add-ons.    Lavender Top    Collection Time: 09/08/20  2:38 PM   Result Value Ref Range    Extra Tube hold for add-on    Gold Top - SST    Collection Time: 09/08/20  2:38 PM   Result Value Ref Range    Extra Tube Hold for add-ons.    Lactic Acid, Plasma    Collection Time: 09/08/20  2:38 PM   Result Value Ref Range    Lactate 1.1 0.5 - 2.0 mmol/L   Procalcitonin    Collection Time: 09/08/20  2:38 PM   Result Value Ref Range    Procalcitonin 0.17 0.00 - 0.25 ng/mL   Hepatitis Panel, Acute    Collection Time: 09/08/20  2:38 PM   Result Value Ref Range    Hepatitis B Surface Ag Non-Reactive Non-Reactive    Hep A IgM Non-Reactive Non-Reactive    Hep B C IgM Non-Reactive Non-Reactive    Hepatitis C Ab Non-Reactive Non-Reactive   Blood Gas, Arterial With Co-Ox    Collection Time: 09/08/20  4:01 PM   Result Value Ref Range    Site Right Brachial     Saul's Test N/A     pH, Arterial 7.446 7.350 - 7.450 pH units    pCO2, Arterial 32.8 (L) 35.0 - 45.0 mm Hg    pO2, Arterial 83.9 83.0 - 108.0 mm Hg    HCO3, Arterial 22.6 20.0 - 26.0 mmol/L    Base Excess, Arterial -0.9 (L) 0.0 - 2.0 mmol/L    Hemoglobin, Blood Gas 11.7 (L) 14 - 18 g/dL    Hematocrit, Blood Gas 35.7 %    Oxyhemoglobin 94.2 94 - 99 %    Methemoglobin 0.40 0.00 - 1.50 %    Carboxyhemoglobin -0.1 (L) 0 - 2 %    CO2 Content 23.6 22 - 33 mmol/L    Temperature 37.0 C    Barometric Pressure for Blood Gas      Modality Nasal Cannula     FIO2 28 %    Ventilator Mode       Note      pH, Temp Corrected 7.446 pH Units    pCO2, Temperature Corrected 32.8 (L) 35 - 45 mm Hg    pO2, Temperature Corrected 83.9 83 - 108 mm Hg   Urinalysis With Microscopic If Indicated (No Culture) - Urine, Catheter    Collection Time: 09/08/20  4:50 PM   Result Value Ref Range    Color, UA Yellow  Yellow, Straw    Appearance, UA Clear Clear    pH, UA 6.0 5.0 - 8.0    Specific Gravity, UA 1.015 1.001 - 1.030    Glucose, UA Negative Negative    Ketones, UA Negative Negative    Bilirubin, UA Negative Negative    Blood, UA Negative Negative    Protein, UA Negative Negative    Leuk Esterase, UA Negative Negative    Nitrite, UA Positive (A) Negative    Urobilinogen, UA 0.2 E.U./dL 0.2 - 1.0 E.U./dL   Urinalysis, Microscopic Only - Urine, Catheter    Collection Time: 09/08/20  4:50 PM   Result Value Ref Range    RBC, UA 0-2 None Seen, 0-2 /HPF    WBC, UA None Seen None Seen, 0-2 /HPF    Bacteria, UA None Seen None Seen, Trace /HPF    Squamous Epithelial Cells, UA None Seen None Seen, 0-2 /HPF    Hyaline Casts, UA 0-6 0 - 6 /LPF    Methodology Automated Microscopy    Osmolality, Serum    Collection Time: 09/08/20  9:01 PM   Result Value Ref Range    Osmolality 260 (L) 275 - 295 mOsm/kg   Sodium, Urine, Random - Urine, Clean Catch    Collection Time: 09/08/20 10:29 PM   Result Value Ref Range    Sodium, Urine 72 mmol/L   Osmolality, Urine - Urine, Clean Catch    Collection Time: 09/08/20 10:29 PM   Result Value Ref Range    Osmolality, Urine 471 300-1,100 mOsm/kg       Ordered the above labs and independently reviewed the results.        RADIOLOGY  Ct Chest Without Contrast    Result Date: 9/8/2020  EXAMINATION: CT CHEST WO CONTRAST-  INDICATION: generalized weakness and hypoxemia; E87.9-Nmpo-zggexeuhng and hyponatremia; R29.6-Repeated falls; R41.82-Altered mental status, unspecified; R09.02-Hypoxemia; I10-Essential (primary) hypertension  TECHNIQUE: Spiral acquisition 5 mm axial images through the chest and upper abdomen  The radiation dose reduction device was turned on for each scan per the ALARA (As Low as Reasonably Achievable) protocol.  COMPARISON: No recent comparison exams 0407 2005 chest CT scan  FINDINGS: History indicates increasing dyspnea X2 days.  There appears to be some chronic left lung volume loss  similar to the prior study. There is mild left lower lobe bronchiectasis associated with a small area of atelectasis along the left upper margin of the patient's large hiatal hernia. Small focus of interstitial disease is seen in the inferior tip of the lingula and may be either acute or chronic but is minimal in extent. There is a new pleural-based nodule, 8 mm in diameter in the lingula. Left lung otherwise appears clear. Trace linear scarring in the right lung apex is stable. Right lung otherwise appears clear except for a calcified upper lobe granuloma. There is no pleural effusion. Hiatal hernia is approximately 8.56 m in diameter. Mediastinal window images show significant reflux into the upper esophagus. No mediastinal mass adenopathy or pericardial effusion is appreciated. There is extensive coronary artery calcification.  Included images of the upper abdomen show no significant abnormalities of the visualized portions of the liver, spleen, pancreas, adrenal glands, or upper renal poles. Gallbladder appears to surgically absent. No upper abdominal free air or ascites or adenopathy is seen. There are lower thoracic and upper lumbar kyphoplasty is, and what appears to be a potential acute fracture at what appears to be L3. Please refer to sagittal image 71 series 900.        1. Moderate to large hiatal hernia and mild associated atelectasis. 2. Trace interstitial disease in the lingula most likely some generalized atelectasis. Inflammation, if present, is minimal. 3. Round and rather bland appearing pleural-based 8 mm nodule in the lingula, but new since 2005. Consider follow-up through [facility] pulmonary nodule clinic. 4. Incidentally noted previous thoracic and lumbar kyphoplasties and what appears to be acute superior endplate compression deformity of L3.         PROCEDURES    None    MEDICATIONS GIVEN IN ER    Medications   sodium chloride 0.9 % flush 10 mL (has no administration in time range)    amiodarone (PACERONE) tablet 200 mg (has no administration in time range)   docusate sodium (COLACE) capsule 100 mg (has no administration in time range)   metoprolol tartrate (LOPRESSOR) tablet 50 mg (0 mg Oral Hold 9/8/20 2147)   pramipexole (MIRAPEX) tablet 1 mg (1 mg Oral Given 9/8/20 2147)   sodium chloride 0.9 % flush 10 mL ( Intravenous Canceled Entry 9/8/20 2059)   sodium chloride 0.9 % flush 10 mL (has no administration in time range)   cefTRIAXone (ROCEPHIN) 1 g/100 mL 0.9% NS (MBP) (1 g Intravenous New Bag 9/8/20 2146)         PROGRESS, DATA ANALYSIS, CONSULTS, AND MEDICAL DECISION MAKING    All labs have been independently reviewed by me.  All radiology studies have been reviewed by me and the radiologist dictating the report.   EKG's have been independently viewed and interpreted by me.          ED Course as of Sep 08 2348   Tue Sep 08, 2020   1535 SpO2(!): 88 % [RS]   1610 We talked with the patient's daughter.  She reports that the patient recently has had increased hypertension, low oxygen saturations, and increased urinary incontinence.  She has been unsteady with gait with a number of falls recently.  They report they did a telehealth call and the patient was prescribed medicines for the high blood pressure as well as for possible urinary tract infection.  The urinary symptoms have improved, but the patient continues to have generalized weakness, confusion, and falls.    [RS]   1645 Sodium(!!): 116 [RS]   1645 Creatinine(!): 1.12 [RS]   1714 Hospitalist paged for admission.    [RS]   1726 Case discussed with Dr. Christensen who will admit.    [RS]      ED Course User Index  [RS] Ramone Walton MD           AS OF 23:48 VITALS:    BP - 139/51  HR - 59  TEMP - 98.1 °F (36.7 °C) (Oral)  O2 SATS - 95%        DIAGNOSIS  Final diagnoses:   Acute hyponatremia   Frequent falls   Acute on chronic alteration in mental status   Hypoxemia   Elevated blood pressure reading with diagnosis of hypertension          DISPOSITION  Admit Hospitalist           Ramone Walton MD  09/08/20 6897     minimum assist (75% patients effort)

## 2022-07-30 ENCOUNTER — TELEPHONE ENCOUNTER (OUTPATIENT)
Age: 82
End: 2022-07-30

## 2022-07-31 ENCOUNTER — TELEPHONE ENCOUNTER (OUTPATIENT)
Age: 82
End: 2022-07-31

## 2022-07-31 RX ORDER — FERROUS SULFATE 325(65) MG
1 (ONE) TABLET ORAL
Qty: 0 | Refills: 4 | Status: ACTIVE | COMMUNITY
Start: 2021-02-12
